# Patient Record
Sex: FEMALE | Race: WHITE | NOT HISPANIC OR LATINO | Employment: FULL TIME
[De-identification: names, ages, dates, MRNs, and addresses within clinical notes are randomized per-mention and may not be internally consistent; named-entity substitution may affect disease eponyms.]

---

## 2017-08-12 ENCOUNTER — HEALTH MAINTENANCE LETTER (OUTPATIENT)
Age: 42
End: 2017-08-12

## 2019-11-06 ENCOUNTER — HEALTH MAINTENANCE LETTER (OUTPATIENT)
Age: 44
End: 2019-11-06

## 2020-11-29 ENCOUNTER — HEALTH MAINTENANCE LETTER (OUTPATIENT)
Age: 45
End: 2020-11-29

## 2021-02-13 ENCOUNTER — HEALTH MAINTENANCE LETTER (OUTPATIENT)
Age: 46
End: 2021-02-13

## 2021-09-19 ENCOUNTER — HEALTH MAINTENANCE LETTER (OUTPATIENT)
Age: 46
End: 2021-09-19

## 2022-01-09 ENCOUNTER — HEALTH MAINTENANCE LETTER (OUTPATIENT)
Age: 47
End: 2022-01-09

## 2022-03-06 ENCOUNTER — HEALTH MAINTENANCE LETTER (OUTPATIENT)
Age: 47
End: 2022-03-06

## 2022-11-21 ENCOUNTER — HEALTH MAINTENANCE LETTER (OUTPATIENT)
Age: 47
End: 2022-11-21

## 2023-04-16 ENCOUNTER — HEALTH MAINTENANCE LETTER (OUTPATIENT)
Age: 48
End: 2023-04-16

## 2024-01-10 ENCOUNTER — HOSPITAL ENCOUNTER (EMERGENCY)
Facility: CLINIC | Age: 49
Discharge: HOME | End: 2024-01-10
Attending: EMERGENCY MEDICINE | Admitting: EMERGENCY MEDICINE
Payer: COMMERCIAL

## 2024-01-10 VITALS
BODY MASS INDEX: 23.9 KG/M2 | RESPIRATION RATE: 20 BRPM | HEIGHT: 64 IN | HEART RATE: 98 BPM | TEMPERATURE: 97.9 F | OXYGEN SATURATION: 98 % | SYSTOLIC BLOOD PRESSURE: 168 MMHG | WEIGHT: 140 LBS | DIASTOLIC BLOOD PRESSURE: 92 MMHG

## 2024-01-10 DIAGNOSIS — I10 HYPERTENSION, UNSPECIFIED TYPE: ICD-10-CM

## 2024-01-10 LAB
ALBUMIN SERPL BCG-MCNC: 4.9 G/DL (ref 3.5–5.2)
ALP SERPL-CCNC: 42 U/L (ref 40–150)
ALT SERPL W P-5'-P-CCNC: 199 U/L (ref 0–50)
ANION GAP SERPL CALCULATED.3IONS-SCNC: 18 MMOL/L (ref 7–15)
AST SERPL W P-5'-P-CCNC: 250 U/L (ref 0–45)
ATRIAL RATE - MUSE: 100 BPM
BASOPHILS # BLD AUTO: 0 10E3/UL (ref 0–0.2)
BASOPHILS NFR BLD AUTO: 0 %
BILIRUB SERPL-MCNC: 0.6 MG/DL
BUN SERPL-MCNC: 5.8 MG/DL (ref 6–20)
CALCIUM SERPL-MCNC: 9.9 MG/DL (ref 8.6–10)
CHLORIDE SERPL-SCNC: 96 MMOL/L (ref 98–107)
CREAT SERPL-MCNC: 0.59 MG/DL (ref 0.51–0.95)
DEPRECATED HCO3 PLAS-SCNC: 24 MMOL/L (ref 22–29)
DIASTOLIC BLOOD PRESSURE - MUSE: NORMAL MMHG
EGFRCR SERPLBLD CKD-EPI 2021: >90 ML/MIN/1.73M2
EOSINOPHIL # BLD AUTO: 0 10E3/UL (ref 0–0.7)
EOSINOPHIL NFR BLD AUTO: 0 %
ERYTHROCYTE [DISTWIDTH] IN BLOOD BY AUTOMATED COUNT: 13.2 % (ref 10–15)
GLUCOSE SERPL-MCNC: 118 MG/DL (ref 70–99)
HCG UR QL: NEGATIVE
HCT VFR BLD AUTO: 39.6 % (ref 35–47)
HGB BLD-MCNC: 13.7 G/DL (ref 11.7–15.7)
IMM GRANULOCYTES # BLD: 0 10E3/UL
IMM GRANULOCYTES NFR BLD: 1 %
INTERPRETATION ECG - MUSE: NORMAL
LYMPHOCYTES # BLD AUTO: 0.4 10E3/UL (ref 0.8–5.3)
LYMPHOCYTES NFR BLD AUTO: 5 %
MCH RBC QN AUTO: 36.1 PG (ref 26.5–33)
MCHC RBC AUTO-ENTMCNC: 34.6 G/DL (ref 31.5–36.5)
MCV RBC AUTO: 104 FL (ref 78–100)
MONOCYTES # BLD AUTO: 0.5 10E3/UL (ref 0–1.3)
MONOCYTES NFR BLD AUTO: 5 %
NEUTROPHILS # BLD AUTO: 7.6 10E3/UL (ref 1.6–8.3)
NEUTROPHILS NFR BLD AUTO: 89 %
NRBC # BLD AUTO: 0 10E3/UL
NRBC BLD AUTO-RTO: 0 /100
P AXIS - MUSE: 49 DEGREES
PLATELET # BLD AUTO: 212 10E3/UL (ref 150–450)
POTASSIUM SERPL-SCNC: 4.4 MMOL/L (ref 3.4–5.3)
PR INTERVAL - MUSE: 160 MS
PROT SERPL-MCNC: 8.1 G/DL (ref 6.4–8.3)
QRS DURATION - MUSE: 82 MS
QT - MUSE: 382 MS
QTC - MUSE: 492 MS
R AXIS - MUSE: 27 DEGREES
RBC # BLD AUTO: 3.8 10E6/UL (ref 3.8–5.2)
SODIUM SERPL-SCNC: 138 MMOL/L (ref 135–145)
SYSTOLIC BLOOD PRESSURE - MUSE: NORMAL MMHG
T AXIS - MUSE: 14 DEGREES
T4 FREE SERPL-MCNC: 1.3 NG/DL (ref 0.9–1.7)
TROPONIN T SERPL HS-MCNC: 9 NG/L
TSH SERPL DL<=0.005 MIU/L-ACNC: 5.32 UIU/ML (ref 0.3–4.2)
VENTRICULAR RATE- MUSE: 100 BPM
WBC # BLD AUTO: 8.5 10E3/UL (ref 4–11)

## 2024-01-10 PROCEDURE — 96374 THER/PROPH/DIAG INJ IV PUSH: CPT

## 2024-01-10 PROCEDURE — 250N000011 HC RX IP 250 OP 636: Performed by: EMERGENCY MEDICINE

## 2024-01-10 PROCEDURE — 93005 ELECTROCARDIOGRAM TRACING: CPT

## 2024-01-10 PROCEDURE — 99284 EMERGENCY DEPT VISIT MOD MDM: CPT | Mod: 25

## 2024-01-10 PROCEDURE — 80053 COMPREHEN METABOLIC PANEL: CPT | Performed by: EMERGENCY MEDICINE

## 2024-01-10 PROCEDURE — 84484 ASSAY OF TROPONIN QUANT: CPT | Performed by: EMERGENCY MEDICINE

## 2024-01-10 PROCEDURE — 36415 COLL VENOUS BLD VENIPUNCTURE: CPT | Performed by: EMERGENCY MEDICINE

## 2024-01-10 PROCEDURE — 81025 URINE PREGNANCY TEST: CPT | Performed by: EMERGENCY MEDICINE

## 2024-01-10 PROCEDURE — 93005 ELECTROCARDIOGRAM TRACING: CPT | Mod: 76

## 2024-01-10 PROCEDURE — 84443 ASSAY THYROID STIM HORMONE: CPT | Performed by: EMERGENCY MEDICINE

## 2024-01-10 PROCEDURE — 84439 ASSAY OF FREE THYROXINE: CPT | Performed by: EMERGENCY MEDICINE

## 2024-01-10 PROCEDURE — 85048 AUTOMATED LEUKOCYTE COUNT: CPT | Performed by: EMERGENCY MEDICINE

## 2024-01-10 RX ORDER — LABETALOL HYDROCHLORIDE 5 MG/ML
10 INJECTION, SOLUTION INTRAVENOUS ONCE
Status: DISCONTINUED | OUTPATIENT
Start: 2024-01-10 | End: 2024-01-10

## 2024-01-10 RX ORDER — ONDANSETRON 2 MG/ML
4 INJECTION INTRAMUSCULAR; INTRAVENOUS EVERY 30 MIN PRN
Status: DISCONTINUED | OUTPATIENT
Start: 2024-01-10 | End: 2024-01-10 | Stop reason: HOSPADM

## 2024-01-10 RX ORDER — METOPROLOL SUCCINATE 50 MG/1
50 TABLET, EXTENDED RELEASE ORAL DAILY
Qty: 30 TABLET | Refills: 0 | Status: SHIPPED | OUTPATIENT
Start: 2024-01-10 | End: 2024-02-09

## 2024-01-10 RX ADMIN — ONDANSETRON 4 MG: 2 INJECTION INTRAMUSCULAR; INTRAVENOUS at 12:19

## 2024-01-10 ASSESSMENT — ACTIVITIES OF DAILY LIVING (ADL)
ADLS_ACUITY_SCORE: 35
ADLS_ACUITY_SCORE: 35

## 2024-01-10 NOTE — ED TRIAGE NOTES
Pt presents for evaluation of hypertension and a shakiness. Pt woke up feeling shaky, so she then checked her BP and found it to be 190/100. Hx of HTN. Is on meds, has been compliant. Denies any unilateral numbness, tingling or weakness. Checked BP multiple times and it kept going up. Denies headache or vision changes. Admits to recreational drinking, last drink was a glass of wine. Is feeling anxious due to symptoms.

## 2024-01-10 NOTE — DISCHARGE INSTRUCTIONS
Take your BP meds as soon as you get home  Rest and push fluids  Check your BP twice daily and report to your doctor  Return if worsening symptoms

## 2024-01-10 NOTE — ED PROVIDER NOTES
"  History     Chief Complaint:  Hypertension       HPI   Amber Rocha is a 48 year old female with hypertension who presents with very high blood pressure readings and shakiness and vomiting.  She states that she went to bed slightly shaky last night and did not sleep very well.  She woke up and felt worse and checked her blood pressure.  She checked it several times and highest she got was 200.  She has had issues blood pressure controlled the past.  She is on 2 blood pressure medications.  She takes her losartan at night.  She states that she gets like this when her blood pressure really gets high.  She denies any chest pain or shortness of breath.  She denies any headache or visual changes.  She is not sure what would cause her blood pressure to become this high.  She denies any changes to her medication or any increased environmental triggers.      Independent Historian:   None - Patient Only    Review of External Notes:   none       Medications:    citalopram (CELEXA) 40 MG tablet  Buproprion  Losartan  Lipitor  Amitriptyline  Toprol XL        Past Medical History:    Past Medical History:   Diagnosis Date    Abnormal maternal glucose tolerance, complicating pregnancy, childbirth, or the puerperium, unspecified as to episode of care     Congenital vascular hamartomas    HTN  High cholesterol  Depression  Migraine      Past Surgical History:    Past Surgical History:   Procedure Laterality Date    [facility] APPENDECTOMY      1996        Physical Exam   Patient Vitals for the past 24 hrs:   BP Temp Temp src Pulse Resp SpO2 Height Weight   01/10/24 1530 (!) 168/92 -- -- 98 20 98 % -- --   01/10/24 1522 (!) 155/113 -- -- 100 16 96 % -- --   01/10/24 1515 (!) 159/95 -- -- 106 30 99 % -- --   01/10/24 1513 -- -- -- -- -- 98 % -- --   01/10/24 1512 (!) 175/108 -- -- 108 -- -- -- --   01/10/24 1227 (!) 167/107 -- -- -- -- -- -- --   01/10/24 1126 (!) 179/139 97.9  F (36.6  C) Oral 113 22 100 % 1.626 m (5' 4\") 63.5 kg (140 " lb)        Physical Exam  Constitutional:       Appearance: She is well-developed. She is diaphoretic.   HENT:      Right Ear: Tympanic membrane and external ear normal.      Left Ear: Tympanic membrane and external ear normal.      Mouth/Throat:      Mouth: Mucous membranes are moist.      Pharynx: Oropharynx is clear. No oropharyngeal exudate or posterior oropharyngeal erythema.   Eyes:      General: No scleral icterus.     Conjunctiva/sclera: Conjunctivae normal.      Pupils: Pupils are equal, round, and reactive to light.   Cardiovascular:      Rate and Rhythm: Regular rhythm. Tachycardia present.      Heart sounds: Normal heart sounds. No murmur heard.     No friction rub. No gallop.   Pulmonary:      Effort: Pulmonary effort is normal. No respiratory distress.      Breath sounds: Normal breath sounds. No wheezing or rales.   Abdominal:      General: Bowel sounds are normal. There is no distension.      Palpations: Abdomen is soft. There is no mass.      Tenderness: There is no abdominal tenderness.   Musculoskeletal:         General: Normal range of motion.      Cervical back: Normal range of motion and neck supple.   Skin:     General: Skin is warm.      Capillary Refill: Capillary refill takes less than 2 seconds.      Findings: No rash.   Neurological:      General: No focal deficit present.      Mental Status: She is alert.      Cranial Nerves: No cranial nerve deficit.      Motor: No weakness.      Coordination: Coordination normal.      Gait: Gait normal.           Emergency Department Course   ECG  ECG taken at 1134, ECG read at 1205  Sinus tach with PVC, no acute St changes  Rate 101 bpm. AL interval 132 ms. QRS duration 80 ms. QT/QTc 364/471 ms. P-R-T axes 10 31 9.       Laboratory:  Labs Ordered and Resulted from Time of ED Arrival to Time of ED Departure   COMPREHENSIVE METABOLIC PANEL - Abnormal       Result Value    Sodium 138      Potassium 4.4      Carbon Dioxide (CO2) 24      Anion Gap 18 (*)      Urea Nitrogen 5.8 (*)     Creatinine 0.59      GFR Estimate >90      Calcium 9.9      Chloride 96 (*)     Glucose 118 (*)     Alkaline Phosphatase 42       (*)      (*)     Protein Total 8.1      Albumin 4.9      Bilirubin Total 0.6     TSH WITH FREE T4 REFLEX - Abnormal    TSH 5.32 (*)    CBC WITH PLATELETS AND DIFFERENTIAL - Abnormal    WBC Count 8.5      RBC Count 3.80      Hemoglobin 13.7      Hematocrit 39.6       (*)     MCH 36.1 (*)     MCHC 34.6      RDW 13.2      Platelet Count 212      % Neutrophils 89      % Lymphocytes 5      % Monocytes 5      % Eosinophils 0      % Basophils 0      % Immature Granulocytes 1      NRBCs per 100 WBC 0      Absolute Neutrophils 7.6      Absolute Lymphocytes 0.4 (*)     Absolute Monocytes 0.5      Absolute Eosinophils 0.0      Absolute Basophils 0.0      Absolute Immature Granulocytes 0.0      Absolute NRBCs 0.0     TROPONIN T, HIGH SENSITIVITY - Normal    Troponin T, High Sensitivity 9     HCG QUALITATIVE URINE - Normal    hCG Urine Qualitative Negative     T4 FREE - Normal    Free T4 1.30            Emergency Department Course & Assessments:       Interventions:  Medications - No data to display       Assessments:  1204 Exam    Independent Interpretation (X-rays, CTs, rhythm strip):  None    Consultations/Discussion of Management or Tests:  None        Social Determinants of Health affecting care:   None and Financial Insecurity    Disposition:  The patient was discharged to home.     Impression & Plan        Medical Decision Making:  Patient presents today for evaluation of feeling shaky as well as high blood pressure.  She was having vomiting on my exam so we give her dose of Zofran.  Her blood pressure came down on its own.  She was feeling better when it did start to come down.  Nausea is much improved and she passed a p.o. challenge.  She denies any headache or vision changes or chest pain.  Her laboratory evaluation was unremarkable.  I am  not sure why her blood pressure was spiking today.  She is advised to continue with her metoprolol and losartan.  I advised her to take the doses when she gets home today.  She is asked to keep track of her blood pressure at least twice a day until she sees her doctor.  She is advised to see her doctor soon as possible.  No neurologic finding to require CT imaging of her head.  Return precaution provided.  Patient is discharged in improved condition.      Diagnosis:    ICD-10-CM    1. Hypertension, unspecified type  I10            Discharge Medications:  Discharge Medication List as of 1/10/2024  3:32 PM        START taking these medications    Details   metoprolol succinate ER (TOPROL XL) 50 MG 24 hr tablet Take 1 tablet (50 mg) by mouth daily for 30 days, Disp-30 tablet, R-0, E-Prescribe                  1/10/2024   Ingrid Conrad MD Cheng, Wenlan, MD  01/10/24 7031

## 2024-01-11 LAB
ATRIAL RATE - MUSE: 101 BPM
DIASTOLIC BLOOD PRESSURE - MUSE: NORMAL MMHG
INTERPRETATION ECG - MUSE: NORMAL
P AXIS - MUSE: 10 DEGREES
PR INTERVAL - MUSE: 132 MS
QRS DURATION - MUSE: 80 MS
QT - MUSE: 364 MS
QTC - MUSE: 471 MS
R AXIS - MUSE: 31 DEGREES
SYSTOLIC BLOOD PRESSURE - MUSE: NORMAL MMHG
T AXIS - MUSE: 9 DEGREES
VENTRICULAR RATE- MUSE: 101 BPM

## 2024-06-22 ENCOUNTER — HEALTH MAINTENANCE LETTER (OUTPATIENT)
Age: 49
End: 2024-06-22

## 2025-02-04 ASSESSMENT — PATIENT HEALTH QUESTIONNAIRE - PHQ9
SUM OF ALL RESPONSES TO PHQ QUESTIONS 1-9: 6
10. IF YOU CHECKED OFF ANY PROBLEMS, HOW DIFFICULT HAVE THESE PROBLEMS MADE IT FOR YOU TO DO YOUR WORK, TAKE CARE OF THINGS AT HOME, OR GET ALONG WITH OTHER PEOPLE: SOMEWHAT DIFFICULT
SUM OF ALL RESPONSES TO PHQ QUESTIONS 1-9: 6

## 2025-02-05 ENCOUNTER — OFFICE VISIT (OUTPATIENT)
Dept: FAMILY MEDICINE | Facility: CLINIC | Age: 50
End: 2025-02-05
Payer: COMMERCIAL

## 2025-02-05 VITALS
SYSTOLIC BLOOD PRESSURE: 133 MMHG | HEART RATE: 97 BPM | HEIGHT: 64 IN | BODY MASS INDEX: 23.68 KG/M2 | OXYGEN SATURATION: 98 % | TEMPERATURE: 98.6 F | WEIGHT: 138.7 LBS | DIASTOLIC BLOOD PRESSURE: 83 MMHG

## 2025-02-05 DIAGNOSIS — R74.8 ELEVATED LIVER ENZYMES: ICD-10-CM

## 2025-02-05 DIAGNOSIS — E78.2 MIXED HYPERLIPIDEMIA: ICD-10-CM

## 2025-02-05 DIAGNOSIS — Z12.11 SCREEN FOR COLON CANCER: ICD-10-CM

## 2025-02-05 DIAGNOSIS — Z11.59 NEED FOR HEPATITIS C SCREENING TEST: ICD-10-CM

## 2025-02-05 DIAGNOSIS — Z12.31 VISIT FOR SCREENING MAMMOGRAM: Primary | ICD-10-CM

## 2025-02-05 DIAGNOSIS — R25.1 TREMOR: ICD-10-CM

## 2025-02-05 DIAGNOSIS — N39.41 URGE INCONTINENCE OF URINE: ICD-10-CM

## 2025-02-05 DIAGNOSIS — Z13.1 SCREENING FOR DIABETES MELLITUS: ICD-10-CM

## 2025-02-05 DIAGNOSIS — D64.9 ANEMIA, UNSPECIFIED TYPE: ICD-10-CM

## 2025-02-05 DIAGNOSIS — I10 BENIGN ESSENTIAL HYPERTENSION: ICD-10-CM

## 2025-02-05 DIAGNOSIS — F32.0 MILD MAJOR DEPRESSION: ICD-10-CM

## 2025-02-05 PROBLEM — R87.810 CERVICAL HIGH RISK HPV (HUMAN PAPILLOMAVIRUS) TEST POSITIVE: Status: ACTIVE | Noted: 2019-10-17

## 2025-02-05 PROBLEM — E03.8 SUBCLINICAL HYPOTHYROIDISM: Status: ACTIVE | Noted: 2024-01-11

## 2025-02-05 LAB
ALBUMIN SERPL BCG-MCNC: 4.4 G/DL (ref 3.5–5.2)
ALP SERPL-CCNC: 38 U/L (ref 40–150)
ALT SERPL W P-5'-P-CCNC: 103 U/L (ref 0–50)
ANION GAP SERPL CALCULATED.3IONS-SCNC: 15 MMOL/L (ref 7–15)
AST SERPL W P-5'-P-CCNC: 96 U/L (ref 0–45)
BILIRUB SERPL-MCNC: 0.4 MG/DL
BUN SERPL-MCNC: 5.7 MG/DL (ref 6–20)
CALCIUM SERPL-MCNC: 9.9 MG/DL (ref 8.8–10.4)
CHLORIDE SERPL-SCNC: 103 MMOL/L (ref 98–107)
CHOLEST SERPL-MCNC: 246 MG/DL
CREAT SERPL-MCNC: 0.61 MG/DL (ref 0.51–0.95)
EGFRCR SERPLBLD CKD-EPI 2021: >90 ML/MIN/1.73M2
ERYTHROCYTE [DISTWIDTH] IN BLOOD BY AUTOMATED COUNT: 14.4 % (ref 10–15)
EST. AVERAGE GLUCOSE BLD GHB EST-MCNC: 97 MG/DL
FASTING STATUS PATIENT QL REPORTED: YES
FASTING STATUS PATIENT QL REPORTED: YES
FERRITIN SERPL-MCNC: 285 NG/ML (ref 6–175)
GLUCOSE SERPL-MCNC: 94 MG/DL (ref 70–99)
HBA1C MFR BLD: 5 % (ref 0–5.6)
HCO3 SERPL-SCNC: 21 MMOL/L (ref 22–29)
HCT VFR BLD AUTO: 34 % (ref 35–47)
HCV AB SERPL QL IA: NONREACTIVE
HDLC SERPL-MCNC: 81 MG/DL
HGB BLD-MCNC: 11.6 G/DL (ref 11.7–15.7)
HOLD SPECIMEN: NORMAL
IRON BINDING CAPACITY (ROCHE): 341 UG/DL (ref 240–430)
IRON SATN MFR SERPL: 22 % (ref 15–46)
IRON SERPL-MCNC: 76 UG/DL (ref 37–145)
LDLC SERPL CALC-MCNC: 151 MG/DL
MCH RBC QN AUTO: 37.9 PG (ref 26.5–33)
MCHC RBC AUTO-ENTMCNC: 34.1 G/DL (ref 31.5–36.5)
MCV RBC AUTO: 111 FL (ref 78–100)
NONHDLC SERPL-MCNC: 165 MG/DL
PLATELET # BLD AUTO: 310 10E3/UL (ref 150–450)
POTASSIUM SERPL-SCNC: 4.3 MMOL/L (ref 3.4–5.3)
PROT SERPL-MCNC: 7.5 G/DL (ref 6.4–8.3)
RBC # BLD AUTO: 3.06 10E6/UL (ref 3.8–5.2)
SODIUM SERPL-SCNC: 139 MMOL/L (ref 135–145)
TRIGL SERPL-MCNC: 68 MG/DL
TSH SERPL DL<=0.005 MIU/L-ACNC: 1.39 UIU/ML (ref 0.3–4.2)
WBC # BLD AUTO: 4.9 10E3/UL (ref 4–11)

## 2025-02-05 PROCEDURE — 86803 HEPATITIS C AB TEST: CPT

## 2025-02-05 PROCEDURE — 83550 IRON BINDING TEST: CPT

## 2025-02-05 PROCEDURE — 80053 COMPREHEN METABOLIC PANEL: CPT

## 2025-02-05 PROCEDURE — 85027 COMPLETE CBC AUTOMATED: CPT

## 2025-02-05 PROCEDURE — 84443 ASSAY THYROID STIM HORMONE: CPT

## 2025-02-05 PROCEDURE — 83036 HEMOGLOBIN GLYCOSYLATED A1C: CPT

## 2025-02-05 PROCEDURE — 80061 LIPID PANEL: CPT

## 2025-02-05 PROCEDURE — 82728 ASSAY OF FERRITIN: CPT

## 2025-02-05 PROCEDURE — 36415 COLL VENOUS BLD VENIPUNCTURE: CPT

## 2025-02-05 PROCEDURE — G2211 COMPLEX E/M VISIT ADD ON: HCPCS

## 2025-02-05 PROCEDURE — 99204 OFFICE O/P NEW MOD 45 MIN: CPT

## 2025-02-05 PROCEDURE — 83540 ASSAY OF IRON: CPT

## 2025-02-05 RX ORDER — ATORVASTATIN CALCIUM 20 MG/1
20 TABLET, FILM COATED ORAL AT BEDTIME
Qty: 90 TABLET | Refills: 3 | Status: SHIPPED | OUTPATIENT
Start: 2025-02-05

## 2025-02-05 RX ORDER — METOPROLOL SUCCINATE 50 MG/1
50 TABLET, EXTENDED RELEASE ORAL DAILY
Qty: 90 TABLET | Refills: 3 | Status: SHIPPED | OUTPATIENT
Start: 2025-02-05

## 2025-02-05 RX ORDER — BUPROPION HYDROCHLORIDE 300 MG/1
300 TABLET ORAL AT BEDTIME
Qty: 90 TABLET | Refills: 3 | Status: SHIPPED | OUTPATIENT
Start: 2025-02-05

## 2025-02-05 RX ORDER — CITALOPRAM HYDROBROMIDE 40 MG/1
40 TABLET ORAL DAILY
Qty: 90 TABLET | Refills: 3 | Status: SHIPPED | OUTPATIENT
Start: 2025-02-05

## 2025-02-05 RX ORDER — BUPROPION HYDROCHLORIDE 300 MG/1
300 TABLET ORAL AT BEDTIME
COMMUNITY
End: 2025-02-05

## 2025-02-05 RX ORDER — IRBESARTAN 300 MG/1
300 TABLET ORAL AT BEDTIME
COMMUNITY
End: 2025-02-05

## 2025-02-05 RX ORDER — IRBESARTAN 300 MG/1
300 TABLET ORAL AT BEDTIME
Qty: 90 TABLET | Refills: 3 | Status: SHIPPED | OUTPATIENT
Start: 2025-02-05

## 2025-02-05 RX ORDER — ATORVASTATIN CALCIUM 20 MG/1
20 TABLET, FILM COATED ORAL AT BEDTIME
COMMUNITY
End: 2025-02-05

## 2025-02-05 ASSESSMENT — PAIN SCALES - GENERAL: PAINLEVEL_OUTOF10: NO PAIN (0)

## 2025-02-05 NOTE — PROGRESS NOTES
Assessment & Plan     Mild major depression  Reviewed management strategies. Recommended decreasing alcohol. Offered therapy but declines at this time. Feels she is coping. Also reviewed importance of sleep, physical activity, and healthy diet.   - buPROPion (WELLBUTRIN XL) 300 MG 24 hr tablet  Dispense: 90 tablet; Refill: 3  - citalopram (CELEXA) 40 MG tablet  Dispense: 90 tablet; Refill: 3    Visit for screening mammogram  - MA Screening Bilateral w/ Ger    Screen for colon cancer  Declines colonoscopy. Would prefer FIT testing. Reviewed family history-no colon cancer.   - Fecal colorectal cancer screen (FIT)  - Fecal colorectal cancer screen (FIT)    Need for hepatitis C screening test  - Hepatitis C Screen Reflex to HCV RNA Quant and Genotype  - Hepatitis C Screen Reflex to HCV RNA Quant and Genotype    Benign essential hypertension  Stable chronic, taking medications as instructed, no medication side effects noted, no TIA's, no chest pain on exertion, no dyspnea on exertion, no swelling of ankles.     - irbesartan (AVAPRO) 300 MG tablet  Dispense: 90 tablet; Refill: 3  - metoprolol succinate ER (TOPROL XL) 50 MG 24 hr tablet  Dispense: 90 tablet; Refill: 3  - Comprehensive metabolic panel (BMP + Alb, Alk Phos, ALT, AST, Total. Bili, TP)  - CBC with Platelets and Reflex to Iron Studies  - Comprehensive metabolic panel (BMP + Alb, Alk Phos, ALT, AST, Total. Bili, TP)  - CBC with Platelets and Reflex to Iron Studies  - Iron & Iron Binding Capacity  - Ferritin    Screening for diabetes mellitus  Screening  - Hemoglobin A1c  - Hemoglobin A1c    Tremor  Rule out thyroid, anemia. If bothersome, refer to neurology for consult. No abnormalities on neurologic exam today.   - TSH with free T4 reflex  - TSH with free T4 reflex    Mixed hyperlipidemia  Stable chronic, monitor with labs, may need to increase atorvastatin, which she is open to. Will manage based on ASCVD to keep risk below 5.5%.   - Lipid panel reflex to  "direct LDL Fasting  - atorvastatin (LIPITOR) 20 MG tablet  Dispense: 90 tablet; Refill: 3  - Lipid panel reflex to direct LDL Fasting    Urge incontinence of urine  Offered referral to PT> She does not have time or money for this at this time, so would like a medication instead.               {FOLLOW UP PLANS (Optional) Includes COVID19 Treatment Plan:096770}    Dean Clark is a 49 year old, presenting for the following health issues:  Establish Care (Here to establish care. /) and Recheck Medication (Patient is here to recheck all her medications. She brought a list to show the provider. )        2/5/2025     9:43 AM   Additional Questions   Roomed by Hyacinth Davalos MA Learner   Accompanied by Self     History of Present Illness       Reason for visit:  Annual Checkup - Prescription Refills   She is taking medications regularly.         Medication Follow Up  Taking Medication as prescribed: yes  Side Effects:  None  Medication Helping Symptoms:  yes    {additonal problems for provider to add (Optional):432789}  Depresson: Has been under a lot of stress, managing with alcohol, has cut back but was drinking a bottle of wine a night. Otherwise tolerating medication well and declines further eval  HTN: No concerns with side effects  DM screening: History of GDM  Tremor: Hands, legs. No palpitations, worse over the past 2-3 months  Incontinence: Has urge and has to go and if isn't near a bathroom, loses control of bladder. No back pain, dysuria.      ROS: 10 point ROS neg other than the symptoms noted above in the HPI.       Objective    /83 (BP Location: Right arm, Patient Position: Sitting, Cuff Size: Adult Regular)   Pulse 97   Temp 98.6  F (37  C) (Oral)   Ht 1.626 m (5' 4\")   Wt 62.9 kg (138 lb 11.2 oz)   LMP 02/03/2025 (Exact Date)   SpO2 98%   BMI 23.81 kg/m    Body mass index is 23.81 kg/m .  Physical Exam   GENERAL: alert and no distress  EYES: Eyes grossly normal to inspection, " PERRL and conjunctivae and sclerae normal  HENT: ear canals and TM's normal, nose and mouth without ulcers or lesions  NECK: no adenopathy, no asymmetry, masses, or scars  RESP: lungs clear to auscultation - no rales, rhonchi or wheezes  CV: regular rate and rhythm, normal S1 S2, no S3 or S4, no murmur, click or rub, no peripheral edema  MS: no gross musculoskeletal defects noted, no edema  SKIN: no suspicious lesions or rashes  NEURO: Normal strength and tone, mentation intact and speech normal, reflexes equal and 2+ bilaterally.  PSYCH: mentation appears normal, affect normal/bright    {Diagnostic Test Results (Optional):461426}        Signed Electronically by: SOPHY Stoll CNP  {Email feedback regarding this note to [email or web address]   :[id number]}

## 2025-02-06 ENCOUNTER — PATIENT OUTREACH (OUTPATIENT)
Dept: CARE COORDINATION | Facility: CLINIC | Age: 50
End: 2025-02-06
Payer: COMMERCIAL

## 2025-02-09 RX ORDER — OXYBUTYNIN CHLORIDE 5 MG/1
5 TABLET, EXTENDED RELEASE ORAL DAILY
Qty: 30 TABLET | Refills: 2 | Status: SHIPPED | OUTPATIENT
Start: 2025-02-09

## 2025-02-28 ENCOUNTER — ANCILLARY PROCEDURE (OUTPATIENT)
Dept: MAMMOGRAPHY | Facility: CLINIC | Age: 50
End: 2025-02-28
Payer: COMMERCIAL

## 2025-02-28 DIAGNOSIS — Z12.31 VISIT FOR SCREENING MAMMOGRAM: ICD-10-CM

## 2025-02-28 PROCEDURE — 77067 SCR MAMMO BI INCL CAD: CPT | Mod: TC | Performed by: RADIOLOGY

## 2025-02-28 PROCEDURE — 77063 BREAST TOMOSYNTHESIS BI: CPT | Mod: TC | Performed by: RADIOLOGY

## 2025-03-03 ENCOUNTER — MYC MEDICAL ADVICE (OUTPATIENT)
Dept: FAMILY MEDICINE | Facility: CLINIC | Age: 50
End: 2025-03-03
Payer: COMMERCIAL

## 2025-03-06 DIAGNOSIS — N39.41 URGE INCONTINENCE OF URINE: ICD-10-CM

## 2025-03-06 RX ORDER — OXYBUTYNIN CHLORIDE 5 MG/1
5 TABLET, EXTENDED RELEASE ORAL DAILY
Qty: 90 TABLET | Refills: 3 | Status: SHIPPED | OUTPATIENT
Start: 2025-03-06

## 2025-04-24 ENCOUNTER — HOSPITAL ENCOUNTER (INPATIENT)
Facility: CLINIC | Age: 50
End: 2025-04-24
Attending: STUDENT IN AN ORGANIZED HEALTH CARE EDUCATION/TRAINING PROGRAM | Admitting: HOSPITALIST
Payer: COMMERCIAL

## 2025-04-24 ENCOUNTER — APPOINTMENT (OUTPATIENT)
Dept: CT IMAGING | Facility: CLINIC | Age: 50
End: 2025-04-24
Attending: STUDENT IN AN ORGANIZED HEALTH CARE EDUCATION/TRAINING PROGRAM
Payer: COMMERCIAL

## 2025-04-24 VITALS
OXYGEN SATURATION: 97 % | SYSTOLIC BLOOD PRESSURE: 137 MMHG | DIASTOLIC BLOOD PRESSURE: 109 MMHG | TEMPERATURE: 98.2 F | RESPIRATION RATE: 22 BRPM | HEART RATE: 92 BPM

## 2025-04-24 DIAGNOSIS — R44.1 VISUAL HALLUCINATIONS: ICD-10-CM

## 2025-04-24 DIAGNOSIS — R74.01 TRANSAMINITIS: ICD-10-CM

## 2025-04-24 DIAGNOSIS — F10.239 ALCOHOL DEPENDENCE WITH WITHDRAWAL WITH COMPLICATION (H): Primary | ICD-10-CM

## 2025-04-24 DIAGNOSIS — E87.1 HYPONATREMIA: ICD-10-CM

## 2025-04-24 DIAGNOSIS — E83.51 HYPOCALCEMIA: ICD-10-CM

## 2025-04-24 PROBLEM — R41.0 DELIRIUM: Status: ACTIVE | Noted: 2025-04-24

## 2025-04-24 PROBLEM — R44.3 HALLUCINATIONS: Status: ACTIVE | Noted: 2025-04-24

## 2025-04-24 PROBLEM — F32.A DEPRESSION: Status: ACTIVE | Noted: 2025-04-24

## 2025-04-24 LAB
ALBUMIN SERPL BCG-MCNC: 4.7 G/DL (ref 3.5–5.2)
ALP SERPL-CCNC: 57 U/L (ref 40–150)
ALT SERPL W P-5'-P-CCNC: 206 U/L (ref 0–50)
AMMONIA PLAS-SCNC: 24 UMOL/L (ref 11–51)
AMPHETAMINES UR QL SCN: ABNORMAL
ANION GAP SERPL CALCULATED.3IONS-SCNC: 19 MMOL/L (ref 7–15)
APAP SERPL-MCNC: <5 UG/ML (ref 10–30)
APPEARANCE CSF: CLEAR
AST SERPL W P-5'-P-CCNC: 226 U/L (ref 0–45)
ATRIAL RATE - MUSE: 75 BPM
BARBITURATES UR QL SCN: ABNORMAL
BASOPHILS # BLD AUTO: 0 10E3/UL (ref 0–0.2)
BASOPHILS NFR BLD AUTO: 0 %
BENZODIAZ UR QL SCN: ABNORMAL
BILIRUB SERPL-MCNC: 0.7 MG/DL
BUN SERPL-MCNC: 8.8 MG/DL (ref 6–20)
BZE UR QL SCN: ABNORMAL
CALCIUM SERPL-MCNC: 10.5 MG/DL (ref 8.8–10.4)
CANNABINOIDS UR QL SCN: ABNORMAL
CHLORIDE SERPL-SCNC: 92 MMOL/L (ref 98–107)
COLOR CSF: COLORLESS
CREAT SERPL-MCNC: 0.79 MG/DL (ref 0.51–0.95)
DIASTOLIC BLOOD PRESSURE - MUSE: NORMAL MMHG
EGFRCR SERPLBLD CKD-EPI 2021: >90 ML/MIN/1.73M2
EOSINOPHIL # BLD AUTO: 0.2 10E3/UL (ref 0–0.7)
EOSINOPHIL NFR BLD AUTO: 2 %
ERYTHROCYTE [DISTWIDTH] IN BLOOD BY AUTOMATED COUNT: 12.6 % (ref 10–15)
ETHANOL SERPL-MCNC: <0.01 G/DL
FENTANYL UR QL: ABNORMAL
GLUCOSE CSF-MCNC: 55 MG/DL (ref 40–70)
GLUCOSE SERPL-MCNC: 77 MG/DL (ref 70–99)
HCO3 SERPL-SCNC: 19 MMOL/L (ref 22–29)
HCT VFR BLD AUTO: 35.5 % (ref 35–47)
HGB BLD-MCNC: 12.4 G/DL (ref 11.7–15.7)
HOLD SPECIMEN: NORMAL
HOLD SPECIMEN: NORMAL
IMM GRANULOCYTES # BLD: 0 10E3/UL
IMM GRANULOCYTES NFR BLD: 1 %
INTERPRETATION ECG - MUSE: NORMAL
LYMPHOCYTES # BLD AUTO: 1.1 10E3/UL (ref 0.8–5.3)
LYMPHOCYTES NFR BLD AUTO: 15 %
MCH RBC QN AUTO: 37.3 PG (ref 26.5–33)
MCHC RBC AUTO-ENTMCNC: 34.9 G/DL (ref 31.5–36.5)
MCV RBC AUTO: 107 FL (ref 78–100)
MONOCYTES # BLD AUTO: 1 10E3/UL (ref 0–1.3)
MONOCYTES NFR BLD AUTO: 13 %
NEUTROPHILS # BLD AUTO: 5 10E3/UL (ref 1.6–8.3)
NEUTROPHILS NFR BLD AUTO: 69 %
NRBC # BLD AUTO: 0 10E3/UL
NRBC BLD AUTO-RTO: 0 /100
OPIATES UR QL SCN: ABNORMAL
P AXIS - MUSE: 33 DEGREES
PCP QUAL URINE (ROCHE): ABNORMAL
PLATELET # BLD AUTO: 252 10E3/UL (ref 150–450)
POTASSIUM SERPL-SCNC: 3.8 MMOL/L (ref 3.4–5.3)
PR INTERVAL - MUSE: 146 MS
PROT CSF-MCNC: 24.1 MG/DL (ref 15–45)
PROT SERPL-MCNC: 8 G/DL (ref 6.4–8.3)
QRS DURATION - MUSE: 88 MS
QT - MUSE: 400 MS
QTC - MUSE: 446 MS
R AXIS - MUSE: 26 DEGREES
RBC # BLD AUTO: 3.32 10E6/UL (ref 3.8–5.2)
RBC # CSF MANUAL: 1 /UL (ref 0–2)
SODIUM SERPL-SCNC: 130 MMOL/L (ref 135–145)
SYSTOLIC BLOOD PRESSURE - MUSE: NORMAL MMHG
T AXIS - MUSE: 8 DEGREES
TUBE # CSF: 4
VENTRICULAR RATE- MUSE: 75 BPM
WBC # BLD AUTO: 7.2 10E3/UL (ref 4–11)
WBC # CSF MANUAL: 1 /UL (ref 0–5)

## 2025-04-24 PROCEDURE — 250N000009 HC RX 250: Performed by: STUDENT IN AN ORGANIZED HEALTH CARE EDUCATION/TRAINING PROGRAM

## 2025-04-24 PROCEDURE — 96375 TX/PRO/DX INJ NEW DRUG ADDON: CPT

## 2025-04-24 PROCEDURE — 82077 ASSAY SPEC XCP UR&BREATH IA: CPT | Performed by: STUDENT IN AN ORGANIZED HEALTH CARE EDUCATION/TRAINING PROGRAM

## 2025-04-24 PROCEDURE — A9585 GADOBUTROL INJECTION: HCPCS | Performed by: STUDENT IN AN ORGANIZED HEALTH CARE EDUCATION/TRAINING PROGRAM

## 2025-04-24 PROCEDURE — 85025 COMPLETE CBC W/AUTO DIFF WBC: CPT | Performed by: PHYSICIAN ASSISTANT

## 2025-04-24 PROCEDURE — 82140 ASSAY OF AMMONIA: CPT | Performed by: STUDENT IN AN ORGANIZED HEALTH CARE EDUCATION/TRAINING PROGRAM

## 2025-04-24 PROCEDURE — 87483 CNS DNA AMP PROBE TYPE 12-25: CPT | Performed by: STUDENT IN AN ORGANIZED HEALTH CARE EDUCATION/TRAINING PROGRAM

## 2025-04-24 PROCEDURE — 70498 CT ANGIOGRAPHY NECK: CPT

## 2025-04-24 PROCEDURE — 250N000011 HC RX IP 250 OP 636: Performed by: STUDENT IN AN ORGANIZED HEALTH CARE EDUCATION/TRAINING PROGRAM

## 2025-04-24 PROCEDURE — 250N000013 HC RX MED GY IP 250 OP 250 PS 637: Performed by: HOSPITALIST

## 2025-04-24 PROCEDURE — 84157 ASSAY OF PROTEIN OTHER: CPT | Performed by: STUDENT IN AN ORGANIZED HEALTH CARE EDUCATION/TRAINING PROGRAM

## 2025-04-24 PROCEDURE — 86255 FLUORESCENT ANTIBODY SCREEN: CPT | Performed by: STUDENT IN AN ORGANIZED HEALTH CARE EDUCATION/TRAINING PROGRAM

## 2025-04-24 PROCEDURE — 82310 ASSAY OF CALCIUM: CPT | Performed by: PHYSICIAN ASSISTANT

## 2025-04-24 PROCEDURE — 36415 COLL VENOUS BLD VENIPUNCTURE: CPT | Performed by: PHYSICIAN ASSISTANT

## 2025-04-24 PROCEDURE — 255N000002 HC RX 255 OP 636: Performed by: STUDENT IN AN ORGANIZED HEALTH CARE EDUCATION/TRAINING PROGRAM

## 2025-04-24 PROCEDURE — 84100 ASSAY OF PHOSPHORUS: CPT | Performed by: HOSPITALIST

## 2025-04-24 PROCEDURE — 83735 ASSAY OF MAGNESIUM: CPT | Performed by: HOSPITALIST

## 2025-04-24 PROCEDURE — 82945 GLUCOSE OTHER FLUID: CPT | Performed by: STUDENT IN AN ORGANIZED HEALTH CARE EDUCATION/TRAINING PROGRAM

## 2025-04-24 PROCEDURE — 80143 DRUG ASSAY ACETAMINOPHEN: CPT | Performed by: STUDENT IN AN ORGANIZED HEALTH CARE EDUCATION/TRAINING PROGRAM

## 2025-04-24 PROCEDURE — 36415 COLL VENOUS BLD VENIPUNCTURE: CPT | Performed by: STUDENT IN AN ORGANIZED HEALTH CARE EDUCATION/TRAINING PROGRAM

## 2025-04-24 PROCEDURE — 96374 THER/PROPH/DIAG INJ IV PUSH: CPT

## 2025-04-24 PROCEDURE — 80307 DRUG TEST PRSMV CHEM ANLYZR: CPT | Performed by: PHYSICIAN ASSISTANT

## 2025-04-24 PROCEDURE — 70450 CT HEAD/BRAIN W/O DYE: CPT

## 2025-04-24 PROCEDURE — 99285 EMERGENCY DEPT VISIT HI MDM: CPT | Mod: 25

## 2025-04-24 PROCEDURE — 88108 CYTOPATH CONCENTRATE TECH: CPT | Mod: 26

## 2025-04-24 PROCEDURE — 89050 BODY FLUID CELL COUNT: CPT | Performed by: STUDENT IN AN ORGANIZED HEALTH CARE EDUCATION/TRAINING PROGRAM

## 2025-04-24 PROCEDURE — 120N000001 HC R&B MED SURG/OB

## 2025-04-24 PROCEDURE — 62270 DX LMBR SPI PNXR: CPT

## 2025-04-24 PROCEDURE — 87070 CULTURE OTHR SPECIMN AEROBIC: CPT | Performed by: STUDENT IN AN ORGANIZED HEALTH CARE EDUCATION/TRAINING PROGRAM

## 2025-04-24 PROCEDURE — 99223 1ST HOSP IP/OBS HIGH 75: CPT | Performed by: HOSPITALIST

## 2025-04-24 PROCEDURE — 93005 ELECTROCARDIOGRAM TRACING: CPT

## 2025-04-24 PROCEDURE — 88108 CYTOPATH CONCENTRATE TECH: CPT | Mod: TC | Performed by: STUDENT IN AN ORGANIZED HEALTH CARE EDUCATION/TRAINING PROGRAM

## 2025-04-24 RX ORDER — CLONIDINE HYDROCHLORIDE 0.1 MG/1
0.1 TABLET ORAL EVERY 8 HOURS
Status: DISCONTINUED | OUTPATIENT
Start: 2025-04-24 | End: 2025-04-25 | Stop reason: HOSPADM

## 2025-04-24 RX ORDER — FLUMAZENIL 0.1 MG/ML
0.2 INJECTION, SOLUTION INTRAVENOUS
Status: DISCONTINUED | OUTPATIENT
Start: 2025-04-24 | End: 2025-04-25 | Stop reason: HOSPADM

## 2025-04-24 RX ORDER — GABAPENTIN 300 MG/1
900 CAPSULE ORAL EVERY 8 HOURS
Status: DISCONTINUED | OUTPATIENT
Start: 2025-04-25 | End: 2025-04-25 | Stop reason: HOSPADM

## 2025-04-24 RX ORDER — METOPROLOL SUCCINATE 25 MG/1
50 TABLET, EXTENDED RELEASE ORAL DAILY
Status: DISCONTINUED | OUTPATIENT
Start: 2025-04-25 | End: 2025-04-25 | Stop reason: HOSPADM

## 2025-04-24 RX ORDER — THIAMINE HYDROCHLORIDE 100 MG/ML
250 INJECTION, SOLUTION INTRAMUSCULAR; INTRAVENOUS DAILY
Status: DISCONTINUED | OUTPATIENT
Start: 2025-04-27 | End: 2025-04-25 | Stop reason: HOSPADM

## 2025-04-24 RX ORDER — OLANZAPINE 5 MG/1
5-10 TABLET, ORALLY DISINTEGRATING ORAL EVERY 6 HOURS PRN
Status: DISCONTINUED | OUTPATIENT
Start: 2025-04-24 | End: 2025-04-25 | Stop reason: HOSPADM

## 2025-04-24 RX ORDER — GABAPENTIN 600 MG/1
1200 TABLET ORAL ONCE
Status: COMPLETED | OUTPATIENT
Start: 2025-04-24 | End: 2025-04-24

## 2025-04-24 RX ORDER — THIAMINE HYDROCHLORIDE 100 MG/ML
500 INJECTION, SOLUTION INTRAMUSCULAR; INTRAVENOUS ONCE
Status: COMPLETED | OUTPATIENT
Start: 2025-04-24 | End: 2025-04-24

## 2025-04-24 RX ORDER — DIAZEPAM 10 MG/1
10 TABLET ORAL EVERY 30 MIN PRN
Status: DISCONTINUED | OUTPATIENT
Start: 2025-04-24 | End: 2025-04-25 | Stop reason: HOSPADM

## 2025-04-24 RX ORDER — IRBESARTAN 300 MG/1
300 TABLET ORAL AT BEDTIME
Status: DISCONTINUED | OUTPATIENT
Start: 2025-04-24 | End: 2025-04-25 | Stop reason: HOSPADM

## 2025-04-24 RX ORDER — GADOBUTROL 604.72 MG/ML
6 INJECTION INTRAVENOUS ONCE
Status: COMPLETED | OUTPATIENT
Start: 2025-04-24 | End: 2025-04-24

## 2025-04-24 RX ORDER — DIAZEPAM 10 MG/2ML
5-10 INJECTION, SOLUTION INTRAMUSCULAR; INTRAVENOUS EVERY 30 MIN PRN
Status: DISCONTINUED | OUTPATIENT
Start: 2025-04-24 | End: 2025-04-25 | Stop reason: HOSPADM

## 2025-04-24 RX ORDER — HALOPERIDOL 5 MG/ML
2.5-5 INJECTION INTRAMUSCULAR EVERY 6 HOURS PRN
Status: DISCONTINUED | OUTPATIENT
Start: 2025-04-24 | End: 2025-04-25 | Stop reason: HOSPADM

## 2025-04-24 RX ORDER — THIAMINE HYDROCHLORIDE 100 MG/ML
500 INJECTION, SOLUTION INTRAMUSCULAR; INTRAVENOUS 3 TIMES DAILY
Status: DISCONTINUED | OUTPATIENT
Start: 2025-04-25 | End: 2025-04-25 | Stop reason: HOSPADM

## 2025-04-24 RX ORDER — IOPAMIDOL 755 MG/ML
67 INJECTION, SOLUTION INTRAVASCULAR ONCE
Status: COMPLETED | OUTPATIENT
Start: 2025-04-24 | End: 2025-04-24

## 2025-04-24 RX ORDER — LORAZEPAM 2 MG/ML
1 INJECTION INTRAMUSCULAR ONCE
Status: COMPLETED | OUTPATIENT
Start: 2025-04-24 | End: 2025-04-24

## 2025-04-24 RX ORDER — GABAPENTIN 300 MG/1
300 CAPSULE ORAL EVERY 8 HOURS
Status: DISCONTINUED | OUTPATIENT
Start: 2025-04-30 | End: 2025-04-25 | Stop reason: HOSPADM

## 2025-04-24 RX ORDER — GABAPENTIN 300 MG/1
600 CAPSULE ORAL EVERY 8 HOURS
Status: DISCONTINUED | OUTPATIENT
Start: 2025-04-28 | End: 2025-04-25 | Stop reason: HOSPADM

## 2025-04-24 RX ORDER — GABAPENTIN 100 MG/1
100 CAPSULE ORAL EVERY 8 HOURS
Status: DISCONTINUED | OUTPATIENT
Start: 2025-05-02 | End: 2025-04-25 | Stop reason: HOSPADM

## 2025-04-24 RX ADMIN — GABAPENTIN 1200 MG: 600 TABLET, FILM COATED ORAL at 23:33

## 2025-04-24 RX ADMIN — CLONIDINE HYDROCHLORIDE 0.1 MG: 0.1 TABLET ORAL at 23:33

## 2025-04-24 RX ADMIN — THIAMINE HYDROCHLORIDE 500 MG: 100 INJECTION, SOLUTION INTRAMUSCULAR; INTRAVENOUS at 22:23

## 2025-04-24 RX ADMIN — SODIUM CHLORIDE 100 ML: 9 INJECTION, SOLUTION INTRAVENOUS at 20:39

## 2025-04-24 RX ADMIN — IOPAMIDOL 67 ML: 755 INJECTION, SOLUTION INTRAVENOUS at 20:39

## 2025-04-24 RX ADMIN — LORAZEPAM 1 MG: 2 INJECTION INTRAMUSCULAR; INTRAVENOUS at 20:30

## 2025-04-24 ASSESSMENT — ACTIVITIES OF DAILY LIVING (ADL)
ADLS_ACUITY_SCORE: 41

## 2025-04-24 ASSESSMENT — COLUMBIA-SUICIDE SEVERITY RATING SCALE - C-SSRS
2. HAVE YOU ACTUALLY HAD ANY THOUGHTS OF KILLING YOURSELF IN THE PAST MONTH?: NO
1. IN THE PAST MONTH, HAVE YOU WISHED YOU WERE DEAD OR WISHED YOU COULD GO TO SLEEP AND NOT WAKE UP?: NO
6. HAVE YOU EVER DONE ANYTHING, STARTED TO DO ANYTHING, OR PREPARED TO DO ANYTHING TO END YOUR LIFE?: NO

## 2025-04-24 NOTE — ED TRIAGE NOTES
Arrives ambulatory from the community. States 1 month ago she took a new medications, states last week due to sleeplessness 2 nights she took benadryl.     Reports she is seeing double, reports she is hallucinating, word finding difficulty, emesis. States this started 1 week ago.     Pressured speech in triage. Brought a book full of her hallucinations written down.

## 2025-04-25 ENCOUNTER — APPOINTMENT (OUTPATIENT)
Dept: MRI IMAGING | Facility: CLINIC | Age: 50
End: 2025-04-25
Attending: STUDENT IN AN ORGANIZED HEALTH CARE EDUCATION/TRAINING PROGRAM
Payer: COMMERCIAL

## 2025-04-25 VITALS
HEIGHT: 64 IN | DIASTOLIC BLOOD PRESSURE: 72 MMHG | TEMPERATURE: 98.3 F | BODY MASS INDEX: 22.66 KG/M2 | OXYGEN SATURATION: 100 % | HEART RATE: 87 BPM | RESPIRATION RATE: 18 BRPM | WEIGHT: 132.72 LBS | SYSTOLIC BLOOD PRESSURE: 111 MMHG

## 2025-04-25 PROBLEM — F10.239 ALCOHOL DEPENDENCE WITH WITHDRAWAL WITH COMPLICATION (H): Status: ACTIVE | Noted: 2025-04-25

## 2025-04-25 LAB
ATRIAL RATE - MUSE: 75 BPM
C GATTII+NEOFOR DNA CSF QL NAA+NON-PROBE: NEGATIVE
CMV DNA CSF QL NAA+NON-PROBE: NEGATIVE
DIASTOLIC BLOOD PRESSURE - MUSE: NORMAL MMHG
E COLI K1 AG CSF QL: NEGATIVE
EV RNA SPEC QL NAA+PROBE: NEGATIVE
GP B STREP DNA CSF QL NAA+NON-PROBE: NEGATIVE
HAEM INFLU DNA CSF QL NAA+NON-PROBE: NEGATIVE
HHV6 DNA CSF QL NAA+NON-PROBE: NEGATIVE
HSV1 DNA CSF QL NAA+NON-PROBE: NEGATIVE
HSV2 DNA CSF QL NAA+NON-PROBE: NEGATIVE
INTERPRETATION ECG - MUSE: NORMAL
L MONOCYTOG DNA CSF QL NAA+NON-PROBE: NEGATIVE
MAGNESIUM SERPL-MCNC: 1.5 MG/DL (ref 1.7–2.3)
N MEN DNA CSF QL NAA+NON-PROBE: NEGATIVE
P AXIS - MUSE: 33 DEGREES
PARECHOVIRUS A RNA CSF QL NAA+NON-PROBE: NEGATIVE
PHOSPHATE SERPL-MCNC: 4 MG/DL (ref 2.5–4.5)
PR INTERVAL - MUSE: 146 MS
QRS DURATION - MUSE: 88 MS
QT - MUSE: 400 MS
QTC - MUSE: 446 MS
R AXIS - MUSE: 26 DEGREES
S PNEUM DNA CSF QL NAA+NON-PROBE: NEGATIVE
SYSTOLIC BLOOD PRESSURE - MUSE: NORMAL MMHG
T AXIS - MUSE: 8 DEGREES
VENTRICULAR RATE- MUSE: 75 BPM
VZV DNA CSF QL NAA+NON-PROBE: NEGATIVE

## 2025-04-25 PROCEDURE — 250N000011 HC RX IP 250 OP 636: Performed by: HOSPITALIST

## 2025-04-25 PROCEDURE — 96376 TX/PRO/DX INJ SAME DRUG ADON: CPT | Mod: XU

## 2025-04-25 PROCEDURE — A9585 GADOBUTROL INJECTION: HCPCS | Performed by: STUDENT IN AN ORGANIZED HEALTH CARE EDUCATION/TRAINING PROGRAM

## 2025-04-25 PROCEDURE — G0378 HOSPITAL OBSERVATION PER HR: HCPCS

## 2025-04-25 PROCEDURE — 70553 MRI BRAIN STEM W/O & W/DYE: CPT

## 2025-04-25 PROCEDURE — 99239 HOSP IP/OBS DSCHRG MGMT >30: CPT | Performed by: INTERNAL MEDICINE

## 2025-04-25 PROCEDURE — 250N000013 HC RX MED GY IP 250 OP 250 PS 637: Performed by: HOSPITALIST

## 2025-04-25 PROCEDURE — 255N000002 HC RX 255 OP 636: Performed by: STUDENT IN AN ORGANIZED HEALTH CARE EDUCATION/TRAINING PROGRAM

## 2025-04-25 RX ORDER — LANOLIN ALCOHOL/MO/W.PET/CERES
100 CREAM (GRAM) TOPICAL DAILY
Qty: 30 TABLET | Refills: 0 | Status: SHIPPED | OUTPATIENT
Start: 2025-05-02 | End: 2025-06-01

## 2025-04-25 RX ORDER — FOLIC ACID 1 MG/1
1 TABLET ORAL DAILY
Qty: 30 TABLET | Refills: 0 | Status: SHIPPED | OUTPATIENT
Start: 2025-04-25

## 2025-04-25 RX ORDER — GADOBUTROL 604.72 MG/ML
6 INJECTION INTRAVENOUS ONCE
Status: COMPLETED | OUTPATIENT
Start: 2025-04-25 | End: 2025-04-25

## 2025-04-25 RX ADMIN — THIAMINE HYDROCHLORIDE 500 MG: 100 INJECTION, SOLUTION INTRAMUSCULAR; INTRAVENOUS at 08:33

## 2025-04-25 RX ADMIN — METOPROLOL SUCCINATE 50 MG: 25 TABLET, EXTENDED RELEASE ORAL at 08:33

## 2025-04-25 RX ADMIN — CLONIDINE HYDROCHLORIDE 0.1 MG: 0.1 TABLET ORAL at 06:21

## 2025-04-25 RX ADMIN — GABAPENTIN 900 MG: 300 CAPSULE ORAL at 06:21

## 2025-04-25 RX ADMIN — GADOBUTROL 6 ML: 604.72 INJECTION INTRAVENOUS at 01:56

## 2025-04-25 ASSESSMENT — ACTIVITIES OF DAILY LIVING (ADL)
ADLS_ACUITY_SCORE: 41
DEPENDENT_IADLS:: INDEPENDENT
ADLS_ACUITY_SCORE: 41

## 2025-04-25 NOTE — PLAN OF CARE
"Cared for [id number]    Pt A/O x 4, VSS; Pt denies pain, headache, dizziness, N/V & SOB. Pt up SBA. Lung sounds clear, RA. CIWA: 2. Bruises upper arms/fore arms. Social Work following. Plan to discharge later today. Will continue with plan of care.    ++++++++++++++++++++++++++++++    PRIMARY DIAGNOSIS: \"GENERIC\" NURSING  OUTPATIENT/OBSERVATION GOALS TO BE MET BEFORE DISCHARGE:  ADLs back to baseline: Yes    Activity and level of assistance: Ambulating independently.    Pain status: Pain free.    Return to near baseline physical activity: Yes     Discharge Planner Nurse   Safe discharge environment identified: Yes  Barriers to discharge: No       Entered by: Malathi Antonio RN 04/25/2025 10:27 AM     Please review provider order for any additional goals.   Nurse to notify provider when observation goals have been met and patient is ready for discharge.    Goal Outcome Evaluation:      Plan of Care Reviewed With: patient    Overall Patient Progress: improvingOverall Patient Progress: improving    Outcome Evaluation: A/O x 4    Problem: Adult Inpatient Plan of Care  Goal: Plan of Care Review  Description: The Plan of Care Review/Shift note should be completed every shift.  The Outcome Evaluation is a brief statement about your assessment that the patient is improving, declining, or no change.  This information will be displayed automatically on your shiftnote.  Outcome: Progressing  Flowsheets (Taken 4/25/2025 1026)  Outcome Evaluation: A/O x 4  Plan of Care Reviewed With: patient  Overall Patient Progress: improving  Goal: Patient-Specific Goal (Individualized)  Description: You can add care plan individualizations to a care plan. Examples of Individualization might be:  \"Parent requests to be called daily at 9am for status\", \"I have a hard time hearing out of my right ear\", or \"Do not touch me to wake me up as it startlesme\".  Outcome: Progressing  Goal: Absence of Hospital-Acquired Illness or Injury  Outcome: " Progressing  Intervention: Identify and Manage Fall Risk  Recent Flowsheet Documentation  Taken 4/25/2025 0830 by Malathi Antonio RN  Safety Promotion/Fall Prevention:   activity supervised   lighting adjusted   mobility aid in reach   nonskid shoes/slippers when out of bed   safety round/check completed   supervised activity  Intervention: Prevent Skin Injury  Recent Flowsheet Documentation  Taken 4/25/2025 0830 by Malathi Antonio RN  Body Position: position changed independently  Intervention: Prevent Infection  Recent Flowsheet Documentation  Taken 4/25/2025 0830 by Malathi Antonio RN  Infection Prevention:   rest/sleep promoted   single patient room provided   hand hygiene promoted  Goal: Optimal Comfort and Wellbeing  Outcome: Progressing  Goal: Readiness for Transition of Care  Outcome: Progressing     Problem: Confusion Acute  Goal: Optimal Cognitive Function  Outcome: Progressing

## 2025-04-25 NOTE — ED NOTES
[facility]  ED Nurse Handoff Report    ED Chief complaint: Hallucinations  . ED Diagnosis:   Final diagnoses:   Visual hallucinations   Transaminitis   Hyponatremia   Hypocalcemia       Allergies:   Allergies   Allergen Reactions    Lisinopril Cough    Sulfa Antibiotics        Code Status: Full Code    Activity level - Baseline/Home:  independent.  Activity Level - Current:   assist of 1.   Lift room needed: No.   Bariatric: No   Needed: No   Isolation: No.   Infection: Not Applicable.     Respiratory status: Room air    Vital Signs (within 30 minutes):   Vitals:    04/24/25 2217 04/24/25 2222 04/24/25 2241 04/24/25 2245   BP: 118/90  121/86 126/84   Pulse: 85 91 97 95   Resp: 19 23 26 17   Temp:       TempSrc:       SpO2:  97% 99% 97%       Cardiac Rhythm:  ,      Pain level:    Patient confused: Yes. - intermittent, hallucinations at times.  Patient Falls Risk: nonskid shoes/slippers when out of bed, patient and family education, assistive device/personal items within reach, and activity supervised.   Elimination Status: Has voided     Patient Report - Initial Complaint:Arrives ambulatory from the community. States 1 month ago she took a new medications, states last week due to sleeplessness 2 nights she took benadryl.      Reports she is seeing double, reports she is hallucinating, word finding difficulty, emesis. States this started 1 week ago.            Pressured speech in triage. Brought a book full of her hallucinations written down.  .   Focused Assessment:    Amber Rocha is a 49 year old female with a past medical history significant for depression and anxiety here for evaluation of hallucinations. The patient reports onset of constant visual hallucinations 1 week ago. She reports seeing people that are not there and seeing inanimate object moving. She denies auditory hallucinations, suicidal ideations, and homicidal ideations. The patient's parents stated that the  patient's daughter told them about the patient's disorganized thoughts and stating things that are not real. The patient has been taking citalopram and bupropion for a long time with no recent change. She recently started taking oxybutynin 2 months ago. The patient reports taking benadryl 2 night prior to onset of hallucinations. She has trouble finding the right words for speech and she also has trouble with GERD with some vomit recently. She denies family history of schizophrenia or bipolar disorder. She drinks alcohol regularly and her last drink was 6 days ago. She denies any other drug use.   Abnormal Results:   Labs Ordered and Resulted from Time of ED Arrival to Time of ED Departure   COMPREHENSIVE METABOLIC PANEL - Abnormal       Result Value    Sodium 130 (*)     Potassium 3.8      Carbon Dioxide (CO2) 19 (*)     Anion Gap 19 (*)     Urea Nitrogen 8.8      Creatinine 0.79      GFR Estimate >90      Calcium 10.5 (*)     Chloride 92 (*)     Glucose 77      Alkaline Phosphatase 57       (*)      (*)     Protein Total 8.0      Albumin 4.7      Bilirubin Total 0.7     CBC WITH PLATELETS AND DIFFERENTIAL - Abnormal    WBC Count 7.2      RBC Count 3.32 (*)     Hemoglobin 12.4      Hematocrit 35.5       (*)     MCH 37.3 (*)     MCHC 34.9      RDW 12.6      Platelet Count 252      % Neutrophils 69      % Lymphocytes 15      % Monocytes 13      % Eosinophils 2      % Basophils 0      % Immature Granulocytes 1      NRBCs per 100 WBC 0      Absolute Neutrophils 5.0      Absolute Lymphocytes 1.1      Absolute Monocytes 1.0      Absolute Eosinophils 0.2      Absolute Basophils 0.0      Absolute Immature Granulocytes 0.0      Absolute NRBCs 0.0     URINE DRUG SCREEN PANEL - Abnormal    Amphetamines Urine Screen Positive (*)     Barbituates Urine Screen Negative      Benzodiazepine Urine Screen Negative      Cannabinoids Urine Screen Negative      Cocaine Urine Screen Negative      Fentanyl Qual Urine  Screen Negative      Opiates Urine Screen Negative      PCP Urine Screen Negative     ACETAMINOPHEN LEVEL - Abnormal    Acetaminophen <5.0 (*)    ETHYL ALCOHOL LEVEL - Normal    Alcohol ethyl <0.01     GLUCOSE CSF - Normal    Glucose CSF 55     PROTEIN TOTAL CSF - Normal    Protein total CSF 24.1     N-METHYL-D-ASPARTATE RECEPTOR ANTIBODY IGG CSF   CELL COUNT CSF   AMMONIA   NON-GYNECOLOGIC CYTOLOGY   AEROBIC BACTERIAL CULTURE ROUTINE   MENINGITIS/ENCEPHALITIS PANEL QUAL PCR CSF   CELL COUNT WITH DIFFERENTIAL CSF        CT Head w/o Contrast   Final Result   IMPRESSION:    HEAD CT:   1.  No CT evidence for acute intracranial process.   2.  Brain atrophy and presumed chronic microvascular ischemic changes as above.      HEAD CTA:    1.  No large vessel occlusion or hemodynamically significant stenosis.      NECK CTA:   1.  No large vessel occlusion or hemodynamically significant stenosis.      CTA Head Neck w Contrast   Final Result   IMPRESSION:    HEAD CT:   1.  No CT evidence for acute intracranial process.   2.  Brain atrophy and presumed chronic microvascular ischemic changes as above.      HEAD CTA:    1.  No large vessel occlusion or hemodynamically significant stenosis.      NECK CTA:   1.  No large vessel occlusion or hemodynamically significant stenosis.      MR Brain w/o & w Contrast    (Results Pending)       Treatments provided: LP puncture, labs, imaging - CT/MRI  Family Comments: parents at bedside.  OBS brochure/video discussed/provided to patient:  No  ED Medications:   Medications   lidocaine 1 % 10 mL (has no administration in time range)   gadobutrol (GADAVIST) injection 6 mL (has no administration in time range)   LORazepam (ATIVAN) injection 1 mg (1 mg Intravenous $Given 4/24/25 2030)   iopamidol (ISOVUE-370) solution 67 mL (67 mLs Intravenous $Given 4/24/25 2039)   sodium chloride 0.9 % bag for CT scan flush (100 mLs Intravenous $Given 4/24/25 2039)   thiamine (B-1) injection 500 mg (500 mg  Intravenous $Given 4/24/25 6764)       Drips infusing:  No  For the majority of the shift this patient was Green.   Interventions performed were none.    Sepsis treatment initiated: No    Cares/treatment/interventions/medications to be completed following ED care: per provider orders.     ED Nurse Name: Dorcas Arreaga RN  11:13 PM

## 2025-04-25 NOTE — CONSULTS
Care Management Initial Consult    General Information  Assessment completed with: Patient,    Type of CM/SW Visit: Initial Assessment    Primary Care Provider verified and updated as needed: Yes   Readmission within the last 30 days: no previous admission in last 30 days      Reason for Consult: discharge planning  Advance Care Planning:            Communication Assessment  Patient's communication style: spoken language (English or Bilingual)             Cognitive  Cognitive/Neuro/Behavioral: WDL                      Living Environment:   People in home: child(karis), adult     Current living Arrangements:        Able to return to prior arrangements: yes       Family/Social Support:  Care provided by: self  Provides care for: no one  Marital Status: Single  Support system: Children, Parent(s)          Description of Support System: Supportive, Involved         Current Resources:   Patient receiving home care services:          Community Resources:    Equipment currently used at home:    Supplies currently used at home:      Employment/Financial:  Employment Status: unemployed (pt reports she was laid off in August)        Financial Concerns: unemployed           Does the patient's insurance plan have a 3 day qualifying hospital stay waiver?  Yes     Which insurance plan 3 day waiver is available? Alternative insurance waiver    Will the waiver be used for post-acute placement? No    Lifestyle & Psychosocial Needs:  Social Drivers of Health     Food Insecurity: Low Risk  (2/4/2025)    Food Insecurity     Within the past 12 months, did you worry that your food would run out before you got money to buy more?: No     Within the past 12 months, did the food you bought just not last and you didn t have money to get more?: No   Depression: Not at risk (2/5/2025)    PHQ-2     PHQ-2 Score: 2   Recent Concern: Depression - At risk (12/3/2024)    Received from [facility]    PHQ-2     PHQ-2 Score: 3   Housing Stability: Low  Risk  (2/4/2025)    Housing Stability     Do you have housing? : Yes     Are you worried about losing your housing?: No   Tobacco Use: Low Risk  (2/5/2025)    Patient History     Smoking Tobacco Use: Never     Smokeless Tobacco Use: Never     Passive Exposure: Not on file   Financial Resource Strain: Low Risk  (2/4/2025)    Financial Resource Strain     Within the past 12 months, have you or your family members you live with been unable to get utilities (heat, electricity) when it was really needed?: No   Alcohol Use: Not on file   Transportation Needs: Low Risk  (2/4/2025)    Transportation Needs     Within the past 12 months, has lack of transportation kept you from medical appointments, getting your medicines, non-medical meetings or appointments, work, or from getting things that you need?: No   Physical Activity: Not on file   Interpersonal Safety: Low Risk  (2/5/2025)    Interpersonal Safety     Do you feel physically and emotionally safe where you currently live?: Yes     Within the past 12 months, have you been hit, slapped, kicked or otherwise physically hurt by someone?: No     Within the past 12 months, have you been humiliated or emotionally abused in other ways by your partner or ex-partner?: No   Stress: Not on file   Social Connections: Not on file   Health Literacy: Not on file       Functional Status:  Prior to admission patient needed assistance:   Dependent ADLs:: Independent  Dependent IADLs:: Independent       Mental Health Status:          Chemical Dependency Status:               Values/Beliefs:  Spiritual, Cultural Beliefs, Anglican Practices, Values that affect care: no               Care Management Discharge Note    Discharge Date: 04/26/2025       Discharge Disposition: Home, Outpatient Chemical Dependency    Discharge Services:  CD Resources    Discharge DME:      Discharge Transportation: agency    Patient/Family in Agreement with the Plan: yes    Handoff Referral Completed: No, handoff  not indicated or clinically appropriate    Additional Information:    SW met with pt at the bedside to offer CD resources. Pt states that she lives at home with her adult son and daughter. She shares that she is independent with cares, her children assist with household tasks. Pt is interested in CD resources, she shares that she has not determined if IP or OP Tx would be preferred. SW provided physical hand out of CD resources and placed additional resources in her AVS. Pt denies further needs at this time. SW signing off.     Alfreda Kumar/TOAN Hough, [facility]  Inpatient Care Coordination  Emergency Room /Float  [phone]    Alfreda Kumar, [facility]

## 2025-04-25 NOTE — ED PROVIDER NOTES
Emergency Department Note      History of Present Illness     Chief Complaint   Hallucinations      HPI   Amber Rocha is a 49 year old female with past medical history of depression and anxiety who presents today with hallucinations and disorganized speech.  Patient reports that these hallucinations started about a week ago.  Hallucinations are visual in nature.  She reports seeing people that are not present and describes in adamant objects moving.  States that these hallucinations occur throughout the day and have been present since that date.  Her parents are present in the room who reports that the patient's daughter called them due to concern with the patient having disorganized thought processes and saying things that were not happening.  Patient has been on citalopram and bupropion for anxiety and depression for a while.  She did recently start the medication oxybutynin about 2 months ago.  Patient reports that she then took a total of two doses of Benadryl for 2 nights prior to these hallucinations taking place.  She also states that she has troubles with word finding difficulties and reflux disease with some small amounts of emesis.  Patient denies any family history of schizophrenia or bipolar.  She denies any auditory hallucinations.  Patient does drink alcohol regularly 4-5 drinks a day. Last drink was Thursday.   Denies any tobacco use, marijuana use, illicit drug use.    Independent Historian   Parents as detailed above.      Past Medical History     Medical History and Problem List   Past Medical History:   Diagnosis Date    Abnormal maternal glucose tolerance, complicating pregnancy, childbirth, or the puerperium, unspecified as to episode of care     Congenital vascular hamartomas     Depressive disorder     Hypertension        Medications   atorvastatin (LIPITOR) 20 MG tablet  buPROPion (WELLBUTRIN XL) 300 MG 24 hr tablet  citalopram (CELEXA) 40 MG tablet  etonogestrel (NEXPLANON) 68 MG  IMPL  irbesartan (AVAPRO) 300 MG tablet  metoprolol succinate ER (TOPROL XL) 50 MG 24 hr tablet  oxyBUTYnin ER (DITROPAN XL) 5 MG 24 hr tablet        Surgical History   Past Surgical History:   Procedure Laterality Date    ZZC APPENDECTOMY      1996       Physical Exam     Patient Vitals for the past 24 hrs:   BP Temp Temp src Pulse Resp SpO2   04/24/25 2153 -- -- -- 86 11 97 %   04/24/25 2145 129/84 -- -- 96 -- --   04/24/25 2130 122/81 -- -- 98 -- --   04/24/25 2121 -- -- -- -- -- 96 %   04/24/25 2115 (!) 122/94 -- -- 90 -- 97 %   04/24/25 2101 127/78 -- -- 87 -- 96 %   04/24/25 2056 126/81 -- -- 98 -- 96 %   04/24/25 1855 (!) 133/99 97.5  F (36.4  C) Temporal 86 18 97 %     Physical Exam  GENERAL: Patient well-nourished. Alert, attentive  HEAD: Atraumatic.  EYES: Anicteric. PERRL  NOSE: No bleeding  MOUTH: Moist mucosa  THROAT: Patent airway.   NECK: No rigidity  CV: RRR, no murmurs, rubs or gallops  PULM: CTAB with good aeration; no retractions, rales, rhonchi, or wheezing  ABD: Soft, nontender, nondistended, no guarding, no peritoneal signs   DERM: Port wine birthmark right flank.  Skin warm and dry  EXTREMITY: Moving all extremities without difficulty. No calf tenderness or peripheral edema  NEURO: Alert, answering questions appropriately. Speaks clearly. CN 2-12 grossly intact.  Strength 5/5 bilateral LE/UE. Sensation fully intact to light touch symmetrically bilateral LE/UE. Normal finger-to-nose and heel to shin. Normal gait assessment without ataxia.  Patient has roving eye movements with slight evidence of nystagmus, but not persistent in the horizontal direction.  Do not see vertical nystagmus.  No clonus.  Patient will have twitching movements in all of her extremities while sleeping, that stop when she awakens.         Diagnostics     Lab Results   Labs Ordered and Resulted from Time of ED Arrival to Time of ED Departure   COMPREHENSIVE METABOLIC PANEL - Abnormal       Result Value    Sodium 130 (*)      Potassium 3.8      Carbon Dioxide (CO2) 19 (*)     Anion Gap 19 (*)     Urea Nitrogen 8.8      Creatinine 0.79      GFR Estimate >90      Calcium 10.5 (*)     Chloride 92 (*)     Glucose 77      Alkaline Phosphatase 57       (*)      (*)     Protein Total 8.0      Albumin 4.7      Bilirubin Total 0.7     CBC WITH PLATELETS AND DIFFERENTIAL - Abnormal    WBC Count 7.2      RBC Count 3.32 (*)     Hemoglobin 12.4      Hematocrit 35.5       (*)     MCH 37.3 (*)     MCHC 34.9      RDW 12.6      Platelet Count 252      % Neutrophils 69      % Lymphocytes 15      % Monocytes 13      % Eosinophils 2      % Basophils 0      % Immature Granulocytes 1      NRBCs per 100 WBC 0      Absolute Neutrophils 5.0      Absolute Lymphocytes 1.1      Absolute Monocytes 1.0      Absolute Eosinophils 0.2      Absolute Basophils 0.0      Absolute Immature Granulocytes 0.0      Absolute NRBCs 0.0     URINE DRUG SCREEN PANEL - Abnormal    Amphetamines Urine Screen Positive (*)     Barbituates Urine Screen Negative      Benzodiazepine Urine Screen Negative      Cannabinoids Urine Screen Negative      Cocaine Urine Screen Negative      Fentanyl Qual Urine Screen Negative      Opiates Urine Screen Negative      PCP Urine Screen Negative     ETHYL ALCOHOL LEVEL - Normal    Alcohol ethyl <0.01     N-METHYL-D-ASPARTATE RECEPTOR ANTIBODY IGG CSF   GLUCOSE CSF   PROTEIN TOTAL CSF   CELL COUNT CSF   AMMONIA   NON-GYNECOLOGIC CYTOLOGY   AEROBIC BACTERIAL CULTURE ROUTINE   MENINGITIS/ENCEPHALITIS PANEL QUAL PCR CSF   CELL COUNT WITH DIFFERENTIAL CSF       Imaging   CT Head w/o Contrast   Final Result   IMPRESSION:    HEAD CT:   1.  No CT evidence for acute intracranial process.   2.  Brain atrophy and presumed chronic microvascular ischemic changes as above.      HEAD CTA:    1.  No large vessel occlusion or hemodynamically significant stenosis.      NECK CTA:   1.  No large vessel occlusion or hemodynamically significant  stenosis.      CTA Head Neck w Contrast   Final Result   IMPRESSION:    HEAD CT:   1.  No CT evidence for acute intracranial process.   2.  Brain atrophy and presumed chronic microvascular ischemic changes as above.      HEAD CTA:    1.  No large vessel occlusion or hemodynamically significant stenosis.      NECK CTA:   1.  No large vessel occlusion or hemodynamically significant stenosis.      MR Brain w/o & w Contrast    (Results Pending)       EKG   ECG results from 04/24/25   EKG 12-lead, tracing only     Value    Systolic Blood Pressure     Diastolic Blood Pressure     Ventricular Rate 75    Atrial Rate 75    MA Interval 146    QRS Duration 88        QTc 446    P Axis 33    R AXIS 26    T Axis 8    Interpretation ECG      Normal sinus rhythm  When compared with ECG of 10-David-2024 11:35,  Premature ventricular complexes are no longer Present  Read by Dr. Arndt at 2107        Independent Interpretation   CT head no bleed.    ED Course      Medications Administered   Medications   lidocaine 1 % 10 mL (has no administration in time range)   thiamine (B-1) injection 500 mg (has no administration in time range)   LORazepam (ATIVAN) injection 1 mg (1 mg Intravenous $Given 4/24/25 2030)   iopamidol (ISOVUE-370) solution 67 mL (67 mLs Intravenous $Given 4/24/25 2039)   sodium chloride 0.9 % bag for CT scan flush (100 mLs Intravenous $Given 4/24/25 2039)       Procedures   Procedures     Lumbar Puncture      Procedure: Lumbar Puncture      Indication: confusion     Consent: Written from Family  Risks Discussed (including but not limited to): Infection, Bleeding, Spinal headache with possibility of spinal patch, and Temporary or permanent neurological injury     Universal Protocol: Universal protocol was followed and time out conducted just prior to starting procedure, confirming patient identity, site/side, procedure, patient position, and availability of correct equipment and implants.      Procedure Note:      Patient was placed in a left lateral decubitus position.  The skin overlying the L4-5 area was prepped with povidone-iodine.    The patient was medicated as above.   A 18 gauge spinal needle was used to gain access to the subarachnoid space with stylet in place.  The fluid was clear.   Stylet was replaced and needle withdrawn.      Patient Status:  The patient tolerated the procedure well: Yes. There were no complications.     Discussion of Management   Admitting Hospitalist, Dr. Mahoney    ED Course        Additional Documentation  None    Medical Decision Making / Diagnosis     CMS Diagnoses: None    MIPS       None    MDM   Amber Rocha is a 49 year old female who presents today with acute onsets of visual hallucinations for about a week.  She has past medical history of anxiety and depression, but no history of hallucinations prior. Patient with good insight into hallucinations and seems unlikely to be new onset psychiatric condition at her age. Possible other differentials include medication interaction, Wernicke's encephalopathy, encephalitis, CNS etiology.  Laboratory workup with some mild electrolyte abnormalities, but no significant causes for confusion.  Urine drug screen was positive for amphetamines however often can be caused from taking bupropion.  On recheck patient still with abnormal behavior and also had tremulous movements.  CT head showed no acute intracranial processes.  Proceed with lumbar puncture to for further investigation of causes of encephalitis. Plan to admit patient for further investigation of hallucinations and change in behavior.  Talked with admitting Dr. Mahoney and they agreed to admit the patient to [facility].    Disposition   The patient was admitted to the hospital.     Diagnosis     ICD-10-CM    1. Visual hallucinations  R44.1       2. Transaminitis  R74.01       3. Hyponatremia  E87.1       4. Hypocalcemia  E83.51            Discharge Medications   New Prescriptions    No  medications on file         ILIR Guzman Kevin, MD  04/24/25 6571

## 2025-04-25 NOTE — PLAN OF CARE
"AOX4, RA. Denies pain. Up ind in the room, voiding. Family at bedside.  Possible discharge today.  /73   Pulse 83   Temp 98.4  F (36.9  C) (Axillary)   Resp 18   Ht 1.626 m (5' 4\")   Wt 60.2 kg (132 lb 11.5 oz)   SpO2 100%   BMI 22.78 kg/m        Goal Outcome Evaluation:      Plan of Care Reviewed With: patient    Overall Patient Progress: improvingOverall Patient Progress: improving    Problem: Adult Inpatient Plan of Care  Goal: Plan of Care Review  Description: The Plan of Care Review/Shift note should be completed every shift.  The Outcome Evaluation is a brief statement about your assessment that the patient is improving, declining, or no change.  This information will be displayed automatically on your shiftnote.  Outcome: Progressing  Flowsheets (Taken 4/25/2025 0707)  Plan of Care Reviewed With: patient  Overall Patient Progress: improving  Goal: Patient-Specific Goal (Individualized)  Description: You can add care plan individualizations to a care plan. Examples of Individualization might be:  \"Parent requests to be called daily at 9am for status\", \"I have a hard time hearing out of my right ear\", or \"Do not touch me to wake me up as it startlesme\".  Outcome: Progressing  Goal: Absence of Hospital-Acquired Illness or Injury  Outcome: Progressing  Goal: Optimal Comfort and Wellbeing  Outcome: Progressing  Goal: Readiness for Transition of Care  Outcome: Progressing            "

## 2025-04-25 NOTE — ED NOTES
"Family called RN into room because patient was \"twitchy\" and \"not acting like herself\". When asked, pt stated she is feeling fine \"just tired\". LANA GUSTAFSON notified.   "

## 2025-04-25 NOTE — DISCHARGE SUMMARY
[facility]  Hospitalist Discharge Summary      Date of Admission:  4/24/2025  Date of Discharge:  4/25/2025  Discharging Provider: Abdon Hdz MD  Discharge Service: Hospitalist Service  Primary Care Physician   Zaynab Tsang    Discharge Diagnoses   Delirium  Visual hallucinations  Alcohol dependence  Likely alcohol withdrawal  Essential hypertension  Depression and anxiety      Hospital Course     Amber Rocha is a 49 year old female admitted on 4/24/2025. She was brought to the emergency department by her family because she is disoriented and talking nonsense. Patient lives at home with her two kids and was acting funny.  Her kids called the patient's parents who brought her to the emergency room.  Patient uses alcohol daily, she has not drank in the last 2 or 3 days prior to admission.  Her lab workup revealed hyponatremia, hypochloremia, hypocarbia, anion gap of 19, , .  She has a make-up ocular volume of 107.  She underwent a thorough workup in the emergency department including lumbar puncture without clear diagnosis.  Toxic screen is positive for amphetamine but is on bupropion that can cross react, patient denies drug use.  Family knows about her alcohol dependence but they do not know anything about other recreational drugs if she were using.     Delirium/toxic metabolic encephalopathy.  Alcohol dependence with withdrawal  Unclear etiology but alcoholic patient that stopped drinking 2 or 3 days ago. Tested positive for methamphetamine though she denies use and is on buproprion that can cross react.  She was placed on vitamins incase this is [place]ern[place]es and CIWA protocol.  She returned to her baseline very quickly.  She will be seen by social work for resources to help her stay sober.  She did have an LP that was negative.  She will go home with recommending sobriety and vitamins.  Her CT and MRI were normal      Essential hypertension.  Continue metoprolol and  Irbesartan.       Major depressive disorder, anxiety  Resume her home medications.  Has a scheduled appointment with her therapist next week.     Clinically Significant Risk Factors          Significant Results and Procedures   Most Recent 3 CBC's:  Recent Labs   Lab Test 04/24/25 2005 02/05/25  1037 01/10/24  1220   WBC 7.2 4.9 8.5   HGB 12.4 11.6* 13.7   * 111* 104*    310 212     Most Recent 3 BMP's:  Recent Labs   Lab Test 04/24/25 2005 02/05/25  1037 01/10/24  1220   * 139 138   POTASSIUM 3.8 4.3 4.4   CHLORIDE 92* 103 96*   CO2 19* 21* 24   BUN 8.8 5.7* 5.8*   CR 0.79 0.61 0.59   ANIONGAP 19* 15 18*   NIGEL 10.5* 9.9 9.9   GLC 77 94 118*   ,   Results for orders placed or performed during the hospital encounter of 04/24/25   CTA Head Neck w Contrast    Narrative    EXAM: CTA HEAD NECK W CONTRAST, CT HEAD W/O CONTRAST  LOCATION: [facility]  DATE/TIME: 4/24/2025 8:57 PM CDT    INDICATION: hallucinating, visual changes  COMPARISON: None.  CONTRAST: 67 mL Isovue 370  TECHNIQUE: Head and neck CT angiogram with IV contrast. Noncontrast head CT followed by axial helical CT images of the head and neck vessels obtained during the arterial phase of intravenous contrast administration. Axial 2D reconstructed images and   multiplanar 3D MIP reconstructed images of the head and neck vessels were performed by the technologist. Dose reduction techniques were used. All stenosis measurements made according to NASCET criteria unless otherwise specified.    FINDINGS:   NONCONTRAST HEAD CT:   INTRACRANIAL CONTENTS: No intracranial hemorrhage, extraaxial collection, or mass effect.  No CT evidence of acute infarct. Mild presumed chronic small vessel ischemic changes. Mild generalized volume loss. No hydrocephalus.     VISUALIZED ORBITS/SINUSES/MASTOIDS: No intraorbital abnormality. No significant paranasal sinus mucosal disease. No middle ear or mastoid effusion.    BONES/SOFT TISSUES: No  acute abnormality.    HEAD CTA:  ANTERIOR CIRCULATION:  Ectasia of the right paraclinoid ICA. No stenosis/occlusion or high flow vascular malformation. Standard [place] anatomy.    POSTERIOR CIRCULATION: No stenosis/occlusion, aneurysm, or high flow vascular malformation. Dominant right and smaller left vertebral artery contribute to a normal basilar artery.     DURAL VENOUS SINUSES: Not well evaluated on a technical basis.    NECK CTA:  RIGHT CAROTID: No measurable stenosis or dissection.    LEFT CAROTID: No measurable stenosis or dissection.    VERTEBRAL ARTERIES: No focal stenosis or dissection. Dominant right and smaller left vertebral arteries.    AORTIC ARCH: Classic aortic arch anatomy with no significant stenosis at the origin of the great vessels.    NONVASCULAR STRUCTURES: Unremarkable.      Impression    IMPRESSION:   HEAD CT:  1.  No CT evidence for acute intracranial process.  2.  Brain atrophy and presumed chronic microvascular ischemic changes as above.    HEAD CTA:   1.  No large vessel occlusion or hemodynamically significant stenosis.    NECK CTA:  1.  No large vessel occlusion or hemodynamically significant stenosis.   CT Head w/o Contrast    Narrative    EXAM: CTA HEAD NECK W CONTRAST, CT HEAD W/O CONTRAST  LOCATION: [facility]  DATE/TIME: 4/24/2025 8:57 PM CDT    INDICATION: hallucinating, visual changes  COMPARISON: None.  CONTRAST: 67 mL Isovue 370  TECHNIQUE: Head and neck CT angiogram with IV contrast. Noncontrast head CT followed by axial helical CT images of the head and neck vessels obtained during the arterial phase of intravenous contrast administration. Axial 2D reconstructed images and   multiplanar 3D MIP reconstructed images of the head and neck vessels were performed by the technologist. Dose reduction techniques were used. All stenosis measurements made according to NASCET criteria unless otherwise specified.    FINDINGS:   NONCONTRAST HEAD CT:    INTRACRANIAL CONTENTS: No intracranial hemorrhage, extraaxial collection, or mass effect.  No CT evidence of acute infarct. Mild presumed chronic small vessel ischemic changes. Mild generalized volume loss. No hydrocephalus.     VISUALIZED ORBITS/SINUSES/MASTOIDS: No intraorbital abnormality. No significant paranasal sinus mucosal disease. No middle ear or mastoid effusion.    BONES/SOFT TISSUES: No acute abnormality.    HEAD CTA:  ANTERIOR CIRCULATION:  Ectasia of the right paraclinoid ICA. No stenosis/occlusion or high flow vascular malformation. Standard [place] anatomy.    POSTERIOR CIRCULATION: No stenosis/occlusion, aneurysm, or high flow vascular malformation. Dominant right and smaller left vertebral artery contribute to a normal basilar artery.     DURAL VENOUS SINUSES: Not well evaluated on a technical basis.    NECK CTA:  RIGHT CAROTID: No measurable stenosis or dissection.    LEFT CAROTID: No measurable stenosis or dissection.    VERTEBRAL ARTERIES: No focal stenosis or dissection. Dominant right and smaller left vertebral arteries.    AORTIC ARCH: Classic aortic arch anatomy with no significant stenosis at the origin of the great vessels.    NONVASCULAR STRUCTURES: Unremarkable.      Impression    IMPRESSION:   HEAD CT:  1.  No CT evidence for acute intracranial process.  2.  Brain atrophy and presumed chronic microvascular ischemic changes as above.    HEAD CTA:   1.  No large vessel occlusion or hemodynamically significant stenosis.    NECK CTA:  1.  No large vessel occlusion or hemodynamically significant stenosis.   MR Brain w/o & w Contrast    Narrative    EXAM: MR BRAIN W/O and W CONTRAST  LOCATION: [facility]  DATE: 4/25/2025    INDICATION: ams, abnormal eye movements, twitching movements  COMPARISON: CTA head neck 4/24/2025  CONTRAST: 6 mL Gadavist  TECHNIQUE: Routine multiplanar multisequence head MRI without and with intravenous  contrast.    FINDINGS:  INTRACRANIAL CONTENTS: No acute or subacute infarct. No mass, acute hemorrhage, or extra-axial fluid collections. Scattered nonspecific T2/FLAIR hyperintensities within the cerebral white matter most consistent with mild chronic microvascular ischemic   change. Moderate atrophy for age. Normal position of the cerebellar tonsils. No pathologic contrast enhancement.    SELLA: No abnormality accounting for technique.    OSSEOUS STRUCTURES/SOFT TISSUES: Normal marrow signal. The major intracranial vascular flow voids are maintained.     ORBITS: No abnormality accounting for technique.     SINUSES/MASTOIDS: No paranasal sinus mucosal disease. No middle ear or mastoid effusion.       Impression    IMPRESSION:  1.  No acute findings.  2.  Prominent atrophy for age.              Follow up/instructions: patient to keep her scheduled appointments with her primary care provider in a week and her mental health therapist.      Pending test results at discharge:      Unresulted Labs Ordered in the Past 30 Days of this Admission       Date and Time Order Name Status Description    4/24/2025  9:51 PM Cytology non gyn Tube 4 In process     4/24/2025  9:51 PM Cerebrospinal fluid Aerobic Bacterial Culture Routine With Gram Stain Preliminary     4/24/2025  9:51 PM N-Methyl-D-Aspartate Receptor Antibody IgG CSF: In process              Discharge Orders      Reason for your hospital stay    Alcohol withdrawal     Activity    Your activity upon discharge: activity as tolerated     Follow Up    Follow up with your mental health therapist in a week as scheduled     Discharge Instructions    Stay sober off of all alcohol !!!!!     Diet    Follow this diet upon discharge: Current Diet:Orders Placed This Encounter      Regular Diet Adult     Hospital Follow-up with Existing Primary Care Provider (PCP)            Discharge Disposition   Discharged to home  Condition at discharge: Stable      Consultations This Hospital  Stay   DIAGNOSTIC EVALUATION CENTER (DEC) ASSESSMENT ORDER  CARE MANAGEMENT / SOCIAL WORK IP CONSULT    Code Status   No Order    Time Spent on this Encounter   I, Abdon Hdz MD, personally saw the patient today and spent greater than 30 minutes discharging this patient.  Patient new to me.  Spoke with nursing, social work/case management, parents bedside updated as well      This document was created using voice recognition technology.  Please excuse any typographical errors that may have occurred.  Please call with any questions.       Abdon Hdz MD  [place] [address] MEDICAL SURGICAL  [address] [place] [address]  Phone: [phone]  Fax: [phone]  ______________________________________________________________________    Physical Exam   Vital Signs: Temp: 98.3  F (36.8  C) Temp src: Oral BP: 111/72 Pulse: 87   Resp: 18 SpO2: 100 % O2 Device: None (Room air)    Weight: 132 lbs 11.47 oz    CIWA 2  General appearance: Patient is alert and oriented x3, no apparent distress, pleasant and conversing normally, speaking in full sentences, appears stated age, sitting up in bed, without significant tremor  HEENT:    Mucous membranes are moist  RESPIRATORY: Clear to auscultation bilateral, good air movement  CARDIOVASCULAR: Regular rate and rhythm, normal S1/S2, no murmurs  GASTROINTESTINAL: Non-distended, non-tender, soft, bowel sounds present throughout  NEUROLOGIC:  Cranial nerves II-XII intact, without any focal deficits, strength 5/5 throughout  EXTREMITIES:  Moves all extremities, no clubbing, cyanosis, nor edema, minimal tremor  :  Astorga not present         Discharge Medications   Current Discharge Medication List        START taking these medications    Details   folic acid (FOLVITE) 1 MG tablet Take 1 tablet (1 mg) by mouth daily.  Qty: 30 tablet, Refills: 0    Associated Diagnoses: Alcohol dependence with withdrawal with complication (H)      thiamine (B-1) 100 MG  tablet Take 1 tablet (100 mg) by mouth daily.  Qty: 30 tablet, Refills: 0    Associated Diagnoses: Alcohol dependence with withdrawal with complication (H)           CONTINUE these medications which have NOT CHANGED    Details   atorvastatin (LIPITOR) 20 MG tablet Take 1 tablet (20 mg) by mouth at bedtime.  Qty: 90 tablet, Refills: 3    Associated Diagnoses: Mixed hyperlipidemia      buPROPion (WELLBUTRIN XL) 300 MG 24 hr tablet Take 1 tablet (300 mg) by mouth at bedtime.  Qty: 90 tablet, Refills: 3    Associated Diagnoses: Mild major depression      citalopram (CELEXA) 40 MG tablet Take 1 tablet (40 mg) by mouth daily.  Qty: 90 tablet, Refills: 3    Associated Diagnoses: Mild major depression      etonogestrel (NEXPLANON) 68 MG IMPL 1 each by Subdermal route once.      irbesartan (AVAPRO) 300 MG tablet Take 1 tablet (300 mg) by mouth at bedtime.  Qty: 90 tablet, Refills: 3    Associated Diagnoses: Benign essential hypertension      metoprolol succinate ER (TOPROL XL) 50 MG 24 hr tablet Take 1 tablet (50 mg) by mouth daily.  Qty: 90 tablet, Refills: 3    Associated Diagnoses: Benign essential hypertension      oxyBUTYnin ER (DITROPAN XL) 5 MG 24 hr tablet TAKE 1 TABLET BY MOUTH EVERY DAY  Qty: 90 tablet, Refills: 3    Associated Diagnoses: Urge incontinence of urine           Allergies   Allergies   Allergen Reactions    Lisinopril Cough    Sulfa Antibiotics

## 2025-04-25 NOTE — DISCHARGE INSTRUCTIONS
Substance Use Disorder Direct Access Resources    It is recommended that you abstain from all mood-altering chemicals. Please contact the [facility] support hotline ([phone]) as needed; phones are answered 24 hours a day, 7 days a week.     To access substance use treatment you must have a comprehensive assessment completed to begin any treatment program.     If uninsured, please contact your county of residence for eligibility screen for substance use disorder evaluation and treatment.   [place] - [phone]  [facility] [phone]  [facility] [phone]  [facility] [phone]  [facility] [phone]  [facility] [phone]  [facility] [phone]  [place] - [phone]    If you have private insurance, call the customer service number on the back of your insurance card to find an in-network substance use disorder assessment. The ideal provider will be a treatment facility, licensed in the [facility].     The following facilities also offer Direct Access Substance Use Disorder assessments:    [facility]  [phone]  Mon-Friday: [address] [facility], [address]  Saturday:  [address], [place], [address]  M-F assessments (7a-1:45p); Saturday assessments (7:45-10:45a)    [facility]  [phone]  [address] [place], [place], [address]  *by appointment only M-W; walk ins available Fridays from 10-2.    [facility]  [phone] (phone consultation available 24/7)  In-person Assessments: [address] [facility], [address], [place] [address]  [address] [address] [place] [address] [address] [place], [place] [address] [place] [address]    [facility]  [phone]  [address] [facility], [address], [place], [address]  *walk in and appointments available by calling    [facility]  [phone] [address] [place], [place], [address]  *by appointment only M-Th     [facility]   [phone]  [address], [place], [address]  *walk ins available M-F    [facility]  [phone]  [address] [place], [place], [address]  *available by appointments only    [facility]  [phone]  [address] [place], [place], [address]  *available by appointment only    [facility] ([facility])  [phone]  [address] [address], [place], [place], [address]  *Walk in assessments also available M-F starting at 8 am.    [facility]  [phone]  [address], [place], [address]  *available by appointments    [facility]  [phone]  [address] [place], [place], [address]  *walk in assessments available M-F starting at 7 am.    [facility]  [phone]  [facility]  [address] , [place], [place], [address]  *Walk in assessments available M-F starting at 8 am OR [phone]    [facility]  [address] [facility], [place] [address]  *Walk in assessments available M-F starting at 8 am    [facility]  [phone]  [address] [place] [address]    [facility]  [phone]  [address] [place], [place], [address]  *available by appointment only    [facility]  [phone]  [address] [facility], [place], [place], [address]    [facility]  [phone]  [address], [place], [place], [address]  *available by appointment    [facility]  [phone]  [address], [place], [place], [address]  *walk ins available M-TH from 9-3    [facility]  [phone]  [address] [place], [place], [address]    [place]  [phone] [address] [place] [address] [address] [place] [address]  [email or web address]  [phone]  [address], [address], [place] [address]    [facility]   [email or web address]  [phone], [address], [place], [place] [id number] [phone], [address], [place] [address]  [phone], [address] [facility], [address], [place] [address]  [phone], [address], [address], ([facility]), [place] [address]  [phone], [address] [place] [address]    If you are intoxicated, you may be required to detox at a detox facility before starting treatment. The following are detox facilities where you may seek services:    [facility]: [address] [place], [place], [id number] [phone]  [facility]: [address] [place], [place], [id number] [phone]  [facility]: [address] [place], [place], [id number] [phone]  [facility]: [address] [place], [place], [address] [phone]  [facility]: [address] [place], [place], [address] [phone]     Ways to help cope with sobriety:  Take prescribed medicines as scheduled  Keep follow-up appointments  Talk to others about your concerns  Get regular exercise  Practice deep breathing skills  Eat a healthy diet  Use community resources, including hotline numbers, [facility] crisis and support meetings  Stay sober and avoid places/people/things associated with substance use  Maintain a daily schedule/routine  Get at least 7-8 hours of sleep per night  Create a list 10--20 healthy activities that you can do that are enjoyable and do not involve substance use  Create daily goals (approx. 1-4 goals) per day and work to achieve them throughout the day    [place] [facility] ([facility]): www.[facility].org  [phone]  [facility] connects people seeking recovery to resources that help foster and sustain long-term recovery. Whether you are seeking resources for treatment, transportation, housing, job training, education,  health care or other pathways to recovery, [facility] is a great place to start. (Great listing of all types of recovery and non-recovery related resources)    [facility]: https://www.smartrecRed Stag Farmsy.org/    Alcoholics Anonymous: 1-800-ALCOHOL  HTTP://WWW.AA.ORG/  AA [place] (523-020-5438 or http://a[place].org)  AA [place] ([phone] or www.aastpaul.org)    Narcotics Anonymous: 949.567.4974  www.[employer].    [facility]: [address] [place] [address], [address], [place]  [phone]  Drop-in Hours: Monday-Friday 9-11:30 am. By appointment at other times.  [facility] is a drop-in center on the east side [facility] that provides a safe space for individuals who are homeless and have a history of chemical use. Sobriety is not a requirement but drugs and alcohol are not allowed on the property.  Non-clients can access drop-in services such as Recovery and Harm Reduction Groups, referrals to case management, community activities, shower facilities, and a pool table. Individuals who are homeless and have chemical health needs may be eligible for enrollment into [facility]'s case management program. Clients and  work together to access benefits, treatment, health care, shelter, and external housing resources.     [facility] Chemical Assessment & Referral Unit: [address] [place] [address]  [phone]  Monday-Friday 8 am-5 pm  Substance use disorder comprehensive assessment and referral for individuals seeking treatment or counseling for chemical dependency. Must be a resident of [facility]. There is no fee for assessment. There is some funding available for treatment programs.    [facility]: [facility]  [phone] or [phone]  Monday - Friday 7 am - 5 pm  Daily drop-in substance use disorder comprehensive assessment and weekly mental health assessments and services. Outpatient chemical dependency treatment (with sober  recovery housing), relapse prevention, case management, and employment services. Outpatient and residential treatment for women with children. Specializes in assisting individuals with a history of relapse who face multiple barriers to achieving stable recovery. No charge for most services or services can be billed through insurance. Gender-specific services and treatment for co-occurring substance abuse and mental health concerns offered.    [facility]: [facility], [address] [place] [address], First Floor, [address], [place] [address] (next to the outpatient lab)  [phone]  Serves people ages 16 and older  Open 5 days/week Walk-in hours: Monday, Wednesday, Friday 9:00 am - 11:00 am and 1:00 pm - 3:00 pm and Tuesday, Thursday 1:30 pm - 4:00 pm  Provides bridging services to people with Opiate Use Disorders (OUD) seeking care. This is a front door to Medication Assisted Treatments (MAT), Peer Recovery and Nursing services and referrals to Substance Use Disorder (YOGESH) Assessments.

## 2025-04-25 NOTE — H&P
[facility]    History and Physical - Hospitalist Service       Date of Admission:  4/24/2025    Assessment & Plan   Amber Rocha is a 49 year old female admitted on 4/24/2025. She was brought to the emergency department by her family because she is disoriented and talking nonsense.  Patient uses alcohol daily, she has not drank in the last 2 or 3 days.  Her lab workup revealed hyponatremia, hypochloremia, hypocarbia, anion gap of 19, , .  She has a make-up ocular volume of 107.  She underwent a thorough workup in the emergency department including lumbar puncture without clear diagnosis.  Toxic screen is positive for amphetamine.  Family knows about her alcohol dependence but they do not know anything about other recreational drugs if she were using.    Delirium/toxic metabolic encephalopathy.    Unclear etiology but alcoholic patient that stopped drinking 2 or 3 days ago.  Tested positive for methamphetamine though unclear whether she is a user or not  Given the evidence we have and final results of lab workup I will treat her as alcohol withdrawal syndrome.  I will put her under thiamine protocol for Wernicke.     Admit to inpatient.  CIWA protocol.  Diet as tolerated.  Electrolyte replacement per protocol.  Gabapentin taper per protocol.  Diazepam per protocol symptoms treated.  Thiamine Wernicke's protocol  Fall precautions.  Seizures precaution  PCD    Essential hypertension.  Continue metoprolol and Irbesartan.    Hold on other home medications.    Major depressive disorder.  Treatment on hold for now    Diet:  ADAT  DVT Prophylaxis: Pneumatic Compression Devices  Astorga Catheter: Not present  Lines: None     Cardiac Monitoring: None  Code Status:  FULL CODE    Clinically Significant Risk Factors Present on Admission         # Hyponatremia: Lowest Na = 130 mmol/L in last 2 days, will monitor as appropriate  # Hypochloremia: Lowest Cl = 92 mmol/L in last 2 days, will monitor  as appropriate     # Anion Gap Metabolic Acidosis: Highest Anion Gap = 19 mmol/L in last 2 days, will monitor and treat as appropriate      # Hypertension: Noted on problem list               # Financial/Environmental Concerns: unemployed         Disposition Plan     Medically Ready for Discharge: Anticipated in 2-4 Days           Froy Mahoney MD  Hospitalist Service  [facility]  Securely message with [employer] (more info)  Text page via [facility] Paging/Directory     ______________________________________________________________________    Chief Complaint   Delirium     History is obtained from the patient, electronic health record, and emergency department physician    History of Present Illness   Amber Rocha is a 49 year old female admitted on 4/24/2025. She was brought to the emergency department by her family because she is disoriented and talking nonsense.  Patient uses alcohol daily, she has not drank in the last 2 or 3 days.  Her lab workup revealed hyponatremia, hypochloremia, hypocarbia, anion gap of 19, , .  She has a make-up ocular volume of 107.  She underwent a thorough workup in the emergency department including lumbar puncture without clear diagnosis.  Toxic screen is positive for amphetamine.  Family knows about her alcohol dependence but they do not know anything about other recreational drugs if she were using.      Past Medical History    Past Medical History:   Diagnosis Date    Abnormal maternal glucose tolerance, complicating pregnancy, childbirth, or the puerperium, unspecified as to episode of care     Congenital vascular hamartomas     Port-Wine on Right breast/chest    Depressive disorder     Hypertension        Past Surgical History   Past Surgical History:   Procedure Laterality Date    Z APPENDECTOMY      1996       Prior to Admission Medications   Prior to Admission Medications   Prescriptions Last Dose Informant Patient Reported? Taking?    atorvastatin (LIPITOR) 20 MG tablet   No No   Sig: Take 1 tablet (20 mg) by mouth at bedtime.   buPROPion (WELLBUTRIN XL) 300 MG 24 hr tablet   No No   Sig: Take 1 tablet (300 mg) by mouth at bedtime.   citalopram (CELEXA) 40 MG tablet   No No   Sig: Take 1 tablet (40 mg) by mouth daily.   etonogestrel (NEXPLANON) 68 MG IMPL   Yes No   Si each by Subdermal route once.   irbesartan (AVAPRO) 300 MG tablet   No No   Sig: Take 1 tablet (300 mg) by mouth at bedtime.   metoprolol succinate ER (TOPROL XL) 50 MG 24 hr tablet   No No   Sig: Take 1 tablet (50 mg) by mouth daily.   oxyBUTYnin ER (DITROPAN XL) 5 MG 24 hr tablet   No No   Sig: TAKE 1 TABLET BY MOUTH EVERY DAY      Facility-Administered Medications: None        Review of Systems    The 10 point Review of Systems is negative other than noted in the HPI or here.       Physical Exam   Vital Signs: Temp: 97.5  F (36.4  C) Temp src: Temporal BP: 129/84 Pulse: 86   Resp: 11 SpO2: 97 % O2 Device: None (Room air)    Weight: 0 lbs 0 oz    GEN:  Awakes to voice, not oriented x place and situation, appears comfortable, NAD.  HEENT:  Normocephalic/atraumatic, no scleral icterus, no nasal discharge, mouth moist.  CV:  Regular rate and rhythm, no murmur or JVD.  S1 + S2 noted, no S3 or S4.  LUNGS:  Clear to auscultation bilaterally without rales/rhonchi/wheezing/retractions.  Symmetric chest rise on inhalation noted.  ABD:  Active bowel sounds, soft, non-tender/non-distended.  No rebound/guarding/rigidity.  EXT:  No edema or cyanosis.  No joint synovitis noted.  SKIN:  Dry to touch, no exanthems noted in the visualized areas.  NEURO: hands tremors, words' salad       Medical Decision Making       75 MINUTES SPENT BY ME on the date of service doing chart review, history, exam, documentation & further activities per the note.      Data     I have personally reviewed the following data over the past 24 hrs:    7.2  \   12.4   / 252     130 (L) 92 (L) 8.8 /  77   3.8 19 (L)  0.79 \     ALT: 206 (H) AST: 226 (H) AP: 57 TBILI: 0.7   ALB: 4.7 TOT PROTEIN: 8.0 LIPASE: N/A       Imaging results reviewed over the past 24 hrs:   Recent Results (from the past 24 hours)   CTA Head Neck w Contrast    Narrative    EXAM: CTA HEAD NECK W CONTRAST, CT HEAD W/O CONTRAST  LOCATION: [facility]  DATE/TIME: 4/24/2025 8:57 PM CDT    INDICATION: hallucinating, visual changes  COMPARISON: None.  CONTRAST: 67 mL Isovue 370  TECHNIQUE: Head and neck CT angiogram with IV contrast. Noncontrast head CT followed by axial helical CT images of the head and neck vessels obtained during the arterial phase of intravenous contrast administration. Axial 2D reconstructed images and   multiplanar 3D MIP reconstructed images of the head and neck vessels were performed by the technologist. Dose reduction techniques were used. All stenosis measurements made according to NASCET criteria unless otherwise specified.    FINDINGS:   NONCONTRAST HEAD CT:   INTRACRANIAL CONTENTS: No intracranial hemorrhage, extraaxial collection, or mass effect.  No CT evidence of acute infarct. Mild presumed chronic small vessel ischemic changes. Mild generalized volume loss. No hydrocephalus.     VISUALIZED ORBITS/SINUSES/MASTOIDS: No intraorbital abnormality. No significant paranasal sinus mucosal disease. No middle ear or mastoid effusion.    BONES/SOFT TISSUES: No acute abnormality.    HEAD CTA:  ANTERIOR CIRCULATION:  Ectasia of the right paraclinoid ICA. No stenosis/occlusion or high flow vascular malformation. Standard [place] anatomy.    POSTERIOR CIRCULATION: No stenosis/occlusion, aneurysm, or high flow vascular malformation. Dominant right and smaller left vertebral artery contribute to a normal basilar artery.     DURAL VENOUS SINUSES: Not well evaluated on a technical basis.    NECK CTA:  RIGHT CAROTID: No measurable stenosis or dissection.    LEFT CAROTID: No measurable stenosis or  dissection.    VERTEBRAL ARTERIES: No focal stenosis or dissection. Dominant right and smaller left vertebral arteries.    AORTIC ARCH: Classic aortic arch anatomy with no significant stenosis at the origin of the great vessels.    NONVASCULAR STRUCTURES: Unremarkable.      Impression    IMPRESSION:   HEAD CT:  1.  No CT evidence for acute intracranial process.  2.  Brain atrophy and presumed chronic microvascular ischemic changes as above.    HEAD CTA:   1.  No large vessel occlusion or hemodynamically significant stenosis.    NECK CTA:  1.  No large vessel occlusion or hemodynamically significant stenosis.   CT Head w/o Contrast    Narrative    EXAM: CTA HEAD NECK W CONTRAST, CT HEAD W/O CONTRAST  LOCATION: [facility]  DATE/TIME: 4/24/2025 8:57 PM CDT    INDICATION: hallucinating, visual changes  COMPARISON: None.  CONTRAST: 67 mL Isovue 370  TECHNIQUE: Head and neck CT angiogram with IV contrast. Noncontrast head CT followed by axial helical CT images of the head and neck vessels obtained during the arterial phase of intravenous contrast administration. Axial 2D reconstructed images and   multiplanar 3D MIP reconstructed images of the head and neck vessels were performed by the technologist. Dose reduction techniques were used. All stenosis measurements made according to NASCET criteria unless otherwise specified.    FINDINGS:   NONCONTRAST HEAD CT:   INTRACRANIAL CONTENTS: No intracranial hemorrhage, extraaxial collection, or mass effect.  No CT evidence of acute infarct. Mild presumed chronic small vessel ischemic changes. Mild generalized volume loss. No hydrocephalus.     VISUALIZED ORBITS/SINUSES/MASTOIDS: No intraorbital abnormality. No significant paranasal sinus mucosal disease. No middle ear or mastoid effusion.    BONES/SOFT TISSUES: No acute abnormality.    HEAD CTA:  ANTERIOR CIRCULATION:  Ectasia of the right paraclinoid ICA. No stenosis/occlusion or high flow vascular  malformation. Standard [place] of [place] anatomy.    POSTERIOR CIRCULATION: No stenosis/occlusion, aneurysm, or high flow vascular malformation. Dominant right and smaller left vertebral artery contribute to a normal basilar artery.     DURAL VENOUS SINUSES: Not well evaluated on a technical basis.    NECK CTA:  RIGHT CAROTID: No measurable stenosis or dissection.    LEFT CAROTID: No measurable stenosis or dissection.    VERTEBRAL ARTERIES: No focal stenosis or dissection. Dominant right and smaller left vertebral arteries.    AORTIC ARCH: Classic aortic arch anatomy with no significant stenosis at the origin of the great vessels.    NONVASCULAR STRUCTURES: Unremarkable.      Impression    IMPRESSION:   HEAD CT:  1.  No CT evidence for acute intracranial process.  2.  Brain atrophy and presumed chronic microvascular ischemic changes as above.    HEAD CTA:   1.  No large vessel occlusion or hemodynamically significant stenosis.    NECK CTA:  1.  No large vessel occlusion or hemodynamically significant stenosis.

## 2025-04-25 NOTE — PLAN OF CARE
Amber Rocha  April 24, 2025  Plan of Care Hand-off Note     Patient Recommended Care Path: pt will be admitted medically    Clinical Substantiation:  Pt presents to [facility] ED this evening due to concerns for acute onset of visual hallucinations, confusion and disorganized thoughts and speech for approximately 1 week. Pt reports she has never experienced symptoms like this before. Pt has also been experiencing emesis and insomnia, though she reports the insomnia is not new. No previous hx of psychosis noted in chart. Pt is calm, cooperative and appears to have good insight into her symptoms. Pt denies current SI, HI, NSSI, or AH. Does endorse regular alcohol use and reports to [facility] that her last drink was on Tuesday. In consultation with pt's attending MD, there is concern that pt's current presentation may be due to possible medication interaction, Wernicke's, or other medical condition. Notes indicate onset of twitchy and tremulous movements while in ED in addition to other previously mentioned symptoms. Per pt's attending,  pt will be admitted medically at this time for safety and further investigation into symptoms and change in behavior. If symptoms do not resolve, would recommend psychiatry consultation. Once pt has stabilized for discharge, she does have established outpatient therapy and medication management she is able to follow up with.    Goals for crisis stabilization:  stabilization and reduction of symptoms    Next steps for Care Team:  plan for medical admission for stabilization. If symptoms do not resolve, would recommend psychiatry consultation.    Treatment Objectives Addressed:  rapport building, orienting the patient to therapy, processing feelings, assessing safety, identifying additional supports    Therapeutic Interventions:  Engaged in safety planning    Has a specific means been identified for suicidal.homicide actions: No    Patient coping skills attempted to reduce the crisis:  Pt came  to the ED with her parents who are a good support for her. Able to engage in assessments and agreeable to recommendations.      Collateral contact information:  Alexa and Tomas, parents, present during time of assessment    Legal Status: Voluntary/Patient has signed consent for treatment                           Reviewed court records: yes     Psychiatry Consult:     KIMBERLY Gonzalez

## 2025-04-25 NOTE — CONSULTS
"Diagnostic Evaluation Consultation  Crisis Assessment    Patient Name: Amber Rocha  Age:  49 year old  Legal Sex: female  Gender Identity: female  Pronouns:   Race: White  Ethnicity: Not  or   Language: English      Patient was assessed: In person   Crisis Assessment Start Date: 04/24/25  Crisis Assessment Start Time: 1950  Crisis Assessment Stop Time: 2007  Patient location: [facility] Emergency Dept                             [id number]    Referral Data and Chief Complaint  Amber Rocha presents to the ED with family/friends. Patient is presenting to the ED for the following concerns: Other (see comment) (visual hallucinations, confusion, disorganized thought and speech). Factors that make the mental health crisis life threatening or complex are: Pt presents to [facility] ED accompanied by her parents for an evaluation due to concerns for hallucinations, disorganized thought and speech. Pt reports that she thinks symptoms have been present for the past week, but reports she has also been experiencing some increased confusion and difficulty remembering things, and notes that her parents report symptoms have been present for more like 2-3 days from what they have observed. Pt denies any current SI, HI, NSSI, or AH. Pt reports seeing spots on the wall moving at the time of assessment. She reports she has been seeing people who are not there and gave an example that earlier today she thought 8 people had walked into her home, but it was only 2. Her mother shares that pt ran into her daughter's room today asking where her sister was and her daughter was confused as she does not have a sister. Pt has one adult son and one adult daughter. When pt's children called pt's parents to the home, they shared that pt made a comment that if she had known \"the boys\" were coming to the house, she would have gone to [employer], but there were no boys in the home. Reports increased difficulty finding her " "words or mixing up her words when talking with others. She reports her adult daughter who lives with her has asked her if she remembers falling or hitting her head. Pt reports she does not think she has had any recent head injuries, but reports it could be possible due to the recent memory difficulty. Her daughter told her she has not seen pt fall or hit her head when she has been with her. Patient has been on citalopram and bupropion for a while now and reports she has been taking these as prescribed. She reports she did recently start the oxybutynin about 2 months ago. Patient reports that she took Benadryl for 2 nights due to insomnia prior to noticing the onset of these hallucinations. Pt reports she has never experienced these symptoms before, and there is no known family hx of bipolar disorder or schizophrenia. She does endorse alcohol use \"more than I should\" and reports drinking almost daily, with last drink being Tuesday. Reports she usually drinks vodka or wine. Denies any other substance use.    Informed Consent and Assessment Methods  Explained the crisis assessment process, including applicable information disclosures and limits to confidentiality, assessed understanding of the process, and obtained consent to proceed with the assessment.  Assessment methods included conducting a formal interview with patient, review of medical records, collaboration with medical staff, and obtaining relevant collateral information from family and community providers when available.  : done     History of the Crisis   Pt has a hx of anxiety and depression. No previous hx of IP MH or IOP. No previous hx of suicide attempts. Pt reports she has a therapist she sees bi-weekly. She reports SOPHY Bernal CNP through [facility] is prescribing her medication currently. She reports she has had one appointment with her so far, thus has not had a chance to update her on these symptoms, but reports she has a " follow-up appointment with her next Wednesday.    Brief Psychosocial History  Family:  Single, Children yes (Pt reports she has an adult son (19) and adult daughter (21) who live with her)  Support System:  Children, Parent(s)  Employment Status:  unemployed (pt reports she was laid off in August)  Source of Income:  unemployment  Financial Environmental Concerns:  unemployed  Current Hobbies:  family functions  Barriers in Personal Life:  other (see comments) (alcohol use concerns, new onset of hallucinations and confusion with concern related to medical condition or alcohol withdrawal)    Significant Clinical History  Current Anxiety Symptoms:  anxious  Current Depression/Trauma:   (none currently observed or reported)  Current Somatic Symptoms:  anxious  Current Psychosis/Thought Disturbance:  forgetful, visual hallucinations (confusion, disorganized though and speech)  Current Eating Symptoms:   (no change reported)  Chemical Use History:  Alcohol: Daily  Last Use:: 04/22/25  Benzodiazepines: None  Opiates: None  Cocaine: None  Marijuana: None  Other Use: None  Withdrawal Symptoms:  (not reported)  Addictions:  (none reported)   Past diagnosis:  Anxiety Disorder, Depression  Family history:  No known history of mental health or chemical health concerns  Past treatment:  Individual therapy, Psychiatric Medication Management  Details of most recent treatment:  Pt reports she has a therapist she sees bi-weekly. She reports SOPHY Bernal CNP through [facility] is her current medication management provider. Reports she has been compliant with her medication.  Other relevant history:       Have there been any medication changes in the past two weeks:  no (oxybutynin - started on 2 months ago)       Is the patient compliant with medications:  yes        Collateral Information  Is there collateral information: Yes     Collateral information name, relationship, phone number:  Melo, parents,  present during time of assessment    What happened today: Reports they drove to pt's home from [place] when pt's children called them out of concern for pt. Brought pt in this evening. Supportive. Reports current presentation is not like patient at all, they have never seen these symptoms in patient before. See embedded collateral above in narrative.     What is different about patient's functioning: Hallucinations, disorganized, not like herself     Has patient made comments about wanting to kill themselves/others: no    If d/c is recommended, can they take part in safety/aftercare planning:  yes     Risk Assessment  [place] Suicide Severity Rating Scale Full Clinical Version:  Suicidal Ideation  Q1 Wish to be Dead (Lifetime): No  Q2 Non-Specific Active Suicidal Thoughts (Lifetime): No  Q6 Suicide Behavior (Lifetime): no     Suicidal Behavior (Lifetime)  Actual Attempt (Lifetime): No  Has subject engaged in non-suicidal self-injurious behavior? (Lifetime): No  Interrupted Attempts (Lifetime): No  Aborted or Self-Interrupted Attempt (Lifetime): No  Preparatory Acts or Behavior (Lifetime): No    [place] Suicide Severity Rating Scale Recent:   Suicidal Ideation (Recent)  Q1 Wished to be Dead (Past Month): no  Q2 Suicidal Thoughts (Past Month): no  Level of Risk per Screen: no risks indicated     Suicidal Behavior (Recent)  Actual Attempt (Past 3 Months): No  Total Number of Actual Attempts (Past 3 Months): 0  Has subject engaged in non-suicidal self-injurious behavior? (Past 3 Months): No  Interrupted Attempts (Past 3 Months): No  Total Number of Interrupted Attempts (Past 3 Months): 0  Aborted or Self-Interrupted Attempt (Past 3 Months): No  Total Number of Aborted or Self-Interrupted Attempts (Past 3 Months): 0  Preparatory Acts or Behavior (Past 3 Months): No  Total Number of Preparatory Acts (Past 3 Months): 0    Environmental or Psychosocial Events: unemployment/underemployment, ongoing abuse of  substances  Protective Factors: Protective Factors: strong bond to family unit, community support, or employment, lives in a responsibly safe and stable environment, help seeking, supportive ongoing medical and mental health care relationships, responsibilities and duties to others, including pets and children    Does the patient have thoughts of harming others? Feels Like Hurting Others: no  Previous Attempt to Hurt Others: no  Current presentation:  (calm and cooperative)  Is the patient engaging in sexually inappropriate behavior?: no  Does Patient have a known history of aggressive behavior: No  Has aggression occurred as a result of MH concerns/diagnosis: no, NA  Does patient have history of aggression in hospital: no    Is the patient engaging in sexually inappropriate behavior?  no        Mental Status Exam   Affect: Appropriate  Appearance: Appropriate  Attention Span/Concentration: Attentive  Eye Contact: Engaged    Fund of Knowledge: Appropriate   Language /Speech Content: Fluent  Language /Speech Volume: Normal  Language /Speech Rate/Productions: Normal  Recent Memory: Variable  Remote Memory: Intact  Mood: Normal  Orientation to Person: Yes   Orientation to Place: Yes  Orientation to Time of Day: Yes  Orientation to Date: Yes     Situation (Do they understand why they are here?): Yes  Psychomotor Behavior: Normal  Thought Content: Hallucinations  Thought Form: Intact     Medication  Psychotropic medications:   Medication Orders - Psychiatric (From admission, onward)      None          Current Facility-Administered Medications   Medication Dose Route Frequency Provider Last Rate Last Admin    lidocaine 1 % 10 mL  10 mL Subcutaneous Once Semaj Arndt MD        thiamine (B-1) injection 500 mg  500 mg Intravenous Once Semaj Arndt MD         Current Outpatient Medications   Medication Sig Dispense Refill    atorvastatin (LIPITOR) 20 MG tablet Take 1 tablet (20 mg) by mouth at bedtime. 90 tablet 3     buPROPion (WELLBUTRIN XL) 300 MG 24 hr tablet Take 1 tablet (300 mg) by mouth at bedtime. 90 tablet 3    citalopram (CELEXA) 40 MG tablet Take 1 tablet (40 mg) by mouth daily. 90 tablet 3    etonogestrel (NEXPLANON) 68 MG IMPL 1 each by Subdermal route once.      irbesartan (AVAPRO) 300 MG tablet Take 1 tablet (300 mg) by mouth at bedtime. 90 tablet 3    metoprolol succinate ER (TOPROL XL) 50 MG 24 hr tablet Take 1 tablet (50 mg) by mouth daily. 90 tablet 3    oxyBUTYnin ER (DITROPAN XL) 5 MG 24 hr tablet TAKE 1 TABLET BY MOUTH EVERY DAY 90 tablet 3      Current Care Team  Patient Care Team:  Amparo Tsang APRN CNP as PCP - General  Amparo Tsang APRN CNP as Assigned PCP    Diagnosis  Patient Active Problem List   Diagnosis Code    Herpes zoster B02.9    Insomnia G47.00    CARDIOVASCULAR SCREENING; LDL GOAL LESS THAN 160 Z13.6    Cervical high risk HPV (human papillomavirus) test positive R87.810    Depression, major, recurrent, in complete remission F33.42    Elevated liver enzymes R74.8    Essential hypertension I10    History of gestational diabetes Z86.32    Migraine with aura and without status migrainosus, not intractable G43.109    Subclinical hypothyroidism E03.8    Hallucinations R44.3    Delirium R41.0    Depression F32.A       Primary Problem This Admission  Active Hospital Problems    Hallucinations  R44.3      Delirium R41.0      Depression F32.A - by history      Clinical Summary and Substantiation of Recommendations   Clinical Substantiation:  Pt presents to [facility] ED this evening due to concerns for acute onset of visual hallucinations, confusion and disorganized thoughts and speech for approximately 1 week. Pt reports she has never experienced symptoms like this before. Pt has also been experiencing emesis and insomnia, though she reports the insomnia is not new. No previous hx of psychosis noted in chart. Pt is calm, cooperative and appears to have good insight into her symptoms. Pt  denies current SI, HI, NSSI, or AH. Does endorse regular alcohol use and reports to [facility] that her last drink was on Tuesday. In consultation with pt's attending MD, there is concern that pt's current presentation may be due to possible medication interaction, Wernicke's, or other medical condition. Notes indicate onset of twitchy and tremulous movements while in ED in addition to other previously mentioned symptoms. Per pt's attending,  pt will be admitted medically at this time for safety and further investigation into symptoms and change in behavior. If symptoms do not resolve, would recommend psychiatry consultation. Once pt has stabilized for discharge, she does have established outpatient therapy and medication management she is able to follow up with.    Goals for crisis stabilization:  stabilization and reduction of symptoms    Next steps for Care Team:  plan for medical admission for stabilization. If symptoms do not resolve, would recommend psychiatry consultation.    Treatment Objectives Addressed:  rapport building, orienting the patient to therapy, processing feelings, assessing safety, identifying additional supports    Therapeutic Interventions:  Engaged in safety planning    Has a specific means been identified for suicidal/homicide actions: No      Patient coping skills attempted to reduce the crisis:  Pt came to the ED with her parents who are a good support for her. Able to engage in assessments and agreeable to recommendations.    Disposition  Recommended referrals: Individual Therapy, Medication Management        Reviewed case and recommendations with attending provider. Attending Name: Dr. Semaj Arndt       Attending concurs with disposition: yes       Patient and/or validated legal guardian concurs with disposition:   yes       Final disposition:  discharge (pt will be admitted medically)      Legal status: Voluntary/Patient has signed consent for treatment                                                      Reviewed court records: yes       Assessment Details   Total duration spent with the patient: 17 min     CPT code(s) utilized: 34997 - Psychotherapy (with patient) - 30 (16-37*) min    KIMBERLY Gonzalez, Psychotherapist  [facility] - Triage & Transition Services  Callback: [phone]

## 2025-04-25 NOTE — PROGRESS NOTES
DC instructions given to pt and parents, verbalized understanding.  All belongings with pt, IV DC'd and documented.

## 2025-04-27 LAB — NMDAR IGG TITR CSF IF: NORMAL {TITER}

## 2025-04-28 LAB
BACTERIA CSF CULT: ABNORMAL
GRAM STAIN RESULT: ABNORMAL
GRAM STAIN RESULT: ABNORMAL

## 2025-04-29 ENCOUNTER — PATIENT OUTREACH (OUTPATIENT)
Dept: FAMILY MEDICINE | Facility: CLINIC | Age: 50
End: 2025-04-29
Payer: COMMERCIAL

## 2025-04-29 LAB
PATH REPORT.COMMENTS IMP SPEC: NORMAL
PATH REPORT.FINAL DX SPEC: NORMAL
PATH REPORT.GROSS SPEC: NORMAL
PATH REPORT.MICROSCOPIC SPEC OTHER STN: NORMAL
PATH REPORT.RELEVANT HX SPEC: NORMAL

## 2025-04-29 NOTE — TELEPHONE ENCOUNTER
Transitions of Care Outreach  Chief Complaint   Patient presents with    Hospital F/U     Hospital Discharge 4/25/2025       Most Recent Admission Date: 4/24/2025   Most Recent Admission Diagnosis: Hypocalcemia - E83.51  Hyponatremia - E87.1  Visual hallucinations - R44.1  Transaminitis - R74.01     Most Recent Discharge Date: 4/25/2025   Most Recent Discharge Diagnosis: Visual hallucinations - R44.1  Transaminitis - R74.01  Hyponatremia - E87.1  Hypocalcemia - E83.51  Alcohol dependence with withdrawal with complication (H) - F10.239     Transitions of Care Assessment    Discharge Assessment  How are you doing now that you are home?: I am good, all symptoms,  How are your symptoms? (Red Flag symptoms escalate to triage hotline per guidelines): Improved  Do you know how to contact your clinic care team if you have future questions or changes to your health status? : Yes  Does the patient have their discharge instructions? : Yes  Does the patient have questions regarding their discharge instructions? : No  Were you started on any new medications or were there changes to any of your previous medications? : Yes (Vitamin)  Does the patient have all of their medications?: Yes  Do you have questions regarding any of your medications? : No  Do you have all of your needed medical supplies or equipment (DME)?  (i.e. oxygen tank, CPAP, cane, etc.): Yes    Follow up Plan     Discharge Follow-Up  Discharge follow up appointment scheduled in alignment with recommended follow up timeframe or Transitions of Risk Category? (Low = within 30 days; Moderate= within 14 days; High= within 7 days): Yes  Discharge Follow Up Appointment Scheduled with?: Specialty Care Provider    Future Appointments   Date Time Provider Department Center   5/7/2025 11:30 AM Amparo Tsang APRN CNP [facility]       Outpatient Plan as outlined on AVS reviewed with patient.    For any urgent concerns, please contact our 24 hour nurse triage line:  [phone] ([phone])       Amber says she has mental health follow up declines to schedule with PCP for that reason.   OLENA Chatman

## 2025-04-29 NOTE — ED PROVIDER NOTES
CSF culture grew staph epi and diphtheroids.  I did run this by infectious Dr. Bowen and this is a contaminant.  Furthermore I called the patient and she feels great.  She has no headache, neck pain, fever or any other symptoms.     Semaj Arndt MD  04/29/25 0931

## 2025-05-07 ENCOUNTER — OFFICE VISIT (OUTPATIENT)
Dept: FAMILY MEDICINE | Facility: CLINIC | Age: 50
End: 2025-05-07
Payer: COMMERCIAL

## 2025-05-07 VITALS
TEMPERATURE: 98.4 F | HEIGHT: 64 IN | RESPIRATION RATE: 12 BRPM | BODY MASS INDEX: 23 KG/M2 | SYSTOLIC BLOOD PRESSURE: 99 MMHG | DIASTOLIC BLOOD PRESSURE: 60 MMHG | HEART RATE: 77 BPM | WEIGHT: 134.7 LBS | OXYGEN SATURATION: 97 %

## 2025-05-07 DIAGNOSIS — F10.239 ALCOHOL DEPENDENCE WITH WITHDRAWAL WITH COMPLICATION (H): ICD-10-CM

## 2025-05-07 DIAGNOSIS — N39.41 URGE INCONTINENCE OF URINE: ICD-10-CM

## 2025-05-07 DIAGNOSIS — R74.8 ELEVATED LIVER ENZYMES: ICD-10-CM

## 2025-05-07 DIAGNOSIS — L20.89 FLEXURAL ATOPIC DERMATITIS: ICD-10-CM

## 2025-05-07 DIAGNOSIS — Z00.00 ROUTINE GENERAL MEDICAL EXAMINATION AT A HEALTH CARE FACILITY: Primary | ICD-10-CM

## 2025-05-07 DIAGNOSIS — D64.9 ANEMIA, UNSPECIFIED TYPE: ICD-10-CM

## 2025-05-07 LAB
ALBUMIN SERPL BCG-MCNC: 4.2 G/DL (ref 3.5–5.2)
ALP SERPL-CCNC: 35 U/L (ref 40–150)
ALT SERPL W P-5'-P-CCNC: 64 U/L (ref 0–50)
ANION GAP SERPL CALCULATED.3IONS-SCNC: 12 MMOL/L (ref 7–15)
AST SERPL W P-5'-P-CCNC: 52 U/L (ref 0–45)
BILIRUB SERPL-MCNC: 0.4 MG/DL
BUN SERPL-MCNC: 9 MG/DL (ref 6–20)
CALCIUM SERPL-MCNC: 10.7 MG/DL (ref 8.8–10.4)
CHLORIDE SERPL-SCNC: 98 MMOL/L (ref 98–107)
CREAT SERPL-MCNC: 0.76 MG/DL (ref 0.51–0.95)
EGFRCR SERPLBLD CKD-EPI 2021: >90 ML/MIN/1.73M2
ERYTHROCYTE [DISTWIDTH] IN BLOOD BY AUTOMATED COUNT: 11.8 % (ref 10–15)
FERRITIN SERPL-MCNC: 213 NG/ML (ref 6–175)
FERRITIN SERPL-MCNC: 221 NG/ML (ref 6–175)
FOLATE SERPL-MCNC: 24.4 NG/ML (ref 4.6–34.8)
GLUCOSE SERPL-MCNC: 117 MG/DL (ref 70–99)
HCO3 SERPL-SCNC: 23 MMOL/L (ref 22–29)
HCT VFR BLD AUTO: 36.1 % (ref 35–47)
HGB BLD-MCNC: 12.4 G/DL (ref 11.7–15.7)
HOLD SPECIMEN: NORMAL
IRON BINDING CAPACITY (ROCHE): 279 UG/DL (ref 240–430)
IRON BINDING CAPACITY (ROCHE): 304 UG/DL (ref 240–430)
IRON SATN MFR SERPL: 23 % (ref 15–46)
IRON SATN MFR SERPL: 26 % (ref 15–46)
IRON SERPL-MCNC: 71 UG/DL (ref 37–145)
IRON SERPL-MCNC: 72 UG/DL (ref 37–145)
MAGNESIUM SERPL-MCNC: 1.7 MG/DL (ref 1.7–2.3)
MCH RBC QN AUTO: 35.9 PG (ref 26.5–33)
MCHC RBC AUTO-ENTMCNC: 34.3 G/DL (ref 31.5–36.5)
MCV RBC AUTO: 105 FL (ref 78–100)
PLATELET # BLD AUTO: 479 10E3/UL (ref 150–450)
POTASSIUM SERPL-SCNC: 4.5 MMOL/L (ref 3.4–5.3)
PROT SERPL-MCNC: 7.3 G/DL (ref 6.4–8.3)
RBC # BLD AUTO: 3.45 10E6/UL (ref 3.8–5.2)
SODIUM SERPL-SCNC: 133 MMOL/L (ref 135–145)
VIT B12 SERPL-MCNC: 426 PG/ML (ref 232–1245)
WBC # BLD AUTO: 7.3 10E3/UL (ref 4–11)

## 2025-05-07 PROCEDURE — 3074F SYST BP LT 130 MM HG: CPT

## 2025-05-07 PROCEDURE — 80053 COMPREHEN METABOLIC PANEL: CPT

## 2025-05-07 PROCEDURE — 99396 PREV VISIT EST AGE 40-64: CPT

## 2025-05-07 PROCEDURE — 82728 ASSAY OF FERRITIN: CPT

## 2025-05-07 PROCEDURE — 83550 IRON BINDING TEST: CPT

## 2025-05-07 PROCEDURE — 1126F AMNT PAIN NOTED NONE PRSNT: CPT

## 2025-05-07 PROCEDURE — 83540 ASSAY OF IRON: CPT

## 2025-05-07 PROCEDURE — 36415 COLL VENOUS BLD VENIPUNCTURE: CPT

## 2025-05-07 PROCEDURE — 82607 VITAMIN B-12: CPT

## 2025-05-07 PROCEDURE — 83735 ASSAY OF MAGNESIUM: CPT

## 2025-05-07 PROCEDURE — 85027 COMPLETE CBC AUTOMATED: CPT

## 2025-05-07 PROCEDURE — 82746 ASSAY OF FOLIC ACID SERUM: CPT

## 2025-05-07 PROCEDURE — 99214 OFFICE O/P EST MOD 30 MIN: CPT | Mod: 25

## 2025-05-07 PROCEDURE — G2211 COMPLEX E/M VISIT ADD ON: HCPCS

## 2025-05-07 PROCEDURE — 3078F DIAST BP <80 MM HG: CPT

## 2025-05-07 RX ORDER — TRIAMCINOLONE ACETONIDE 1 MG/G
CREAM TOPICAL 2 TIMES DAILY
Qty: 30 G | Refills: 0 | Status: SHIPPED | OUTPATIENT
Start: 2025-05-07

## 2025-05-07 SDOH — HEALTH STABILITY: PHYSICAL HEALTH: ON AVERAGE, HOW MANY DAYS PER WEEK DO YOU ENGAGE IN MODERATE TO STRENUOUS EXERCISE (LIKE A BRISK WALK)?: 2 DAYS

## 2025-05-07 SDOH — HEALTH STABILITY: PHYSICAL HEALTH: ON AVERAGE, HOW MANY MINUTES DO YOU ENGAGE IN EXERCISE AT THIS LEVEL?: 20 MIN

## 2025-05-07 ASSESSMENT — PATIENT HEALTH QUESTIONNAIRE - PHQ9
10. IF YOU CHECKED OFF ANY PROBLEMS, HOW DIFFICULT HAVE THESE PROBLEMS MADE IT FOR YOU TO DO YOUR WORK, TAKE CARE OF THINGS AT HOME, OR GET ALONG WITH OTHER PEOPLE: SOMEWHAT DIFFICULT
SUM OF ALL RESPONSES TO PHQ QUESTIONS 1-9: 7
SUM OF ALL RESPONSES TO PHQ QUESTIONS 1-9: 7

## 2025-05-07 ASSESSMENT — PAIN SCALES - GENERAL: PAINLEVEL_OUTOF10: NO PAIN (0)

## 2025-05-07 ASSESSMENT — SOCIAL DETERMINANTS OF HEALTH (SDOH): HOW OFTEN DO YOU GET TOGETHER WITH FRIENDS OR RELATIVES?: ONCE A WEEK

## 2025-05-07 NOTE — PATIENT INSTRUCTIONS
Patient Education   Preventive Care Advice   This is general advice given by our system to help you stay healthy. However, your care team may have specific advice just for you. Please talk to your care team about your preventive care needs.  Nutrition  Eat 5 or more servings of fruits and vegetables each day.  Try wheat bread, brown rice and whole grain pasta (instead of white bread, rice, and pasta).  Get enough calcium and vitamin D. Check the label on foods and aim for 100% of the RDA (recommended daily allowance).  Lifestyle  Exercise at least 150 minutes each week  (30 minutes a day, 5 days a week).  Do muscle strengthening activities 2 days a week. These help control your weight and prevent disease.  No smoking.  Wear sunscreen to prevent skin cancer.  Have a dental exam and cleaning every 6 months.  Yearly exams  See your health care team every year to talk about:  Any changes in your health.  Any medicines your care team has prescribed.  Preventive care, family planning, and ways to prevent chronic diseases.  Shots (vaccines)   HPV shots (up to age 26), if you've never had them before.  Hepatitis B shots (up to age 59), if you've never had them before.  COVID-19 shot: Get this shot when it's due.  Flu shot: Get a flu shot every year.  Tetanus shot: Get a tetanus shot every 10 years.  Pneumococcal, hepatitis A, and RSV shots: Ask your care team if you need these based on your risk.  Shingles shot (for age 50 and up)  General health tests  Diabetes screening:  Starting at age 35, Get screened for diabetes at least every 3 years.  If you are younger than age 35, ask your care team if you should be screened for diabetes.  Cholesterol test: At age 39, start having a cholesterol test every 5 years, or more often if advised.  Bone density scan (DEXA): At age 50, ask your care team if you should have this scan for osteoporosis (brittle bones).  Hepatitis C: Get tested at least once in your life.  STIs (sexually  transmitted infections)  Before age 24: Ask your care team if you should be screened for STIs.  After age 24: Get screened for STIs if you're at risk. You are at risk for STIs (including HIV) if:  You are sexually active with more than one person.  You don't use condoms every time.  You or a partner was diagnosed with a sexually transmitted infection.  If you are at risk for HIV, ask about PrEP medicine to prevent HIV.  Get tested for HIV at least once in your life, whether you are at risk for HIV or not.  Cancer screening tests  Cervical cancer screening: If you have a cervix, begin getting regular cervical cancer screening tests starting at age 21.  Breast cancer scan (mammogram): If you've ever had breasts, begin having regular mammograms starting at age 40. This is a scan to check for breast cancer.  Colon cancer screening: It is important to start screening for colon cancer at age 45.  Have a colonoscopy test every 10 years (or more often if you're at risk) Or, ask your provider about stool tests like a FIT test every year or Cologuard test every 3 years.  To learn more about your testing options, visit:   .  For help making a decision, visit:   https://bit.ly/um73596.  Prostate cancer screening test: If you have a prostate, ask your care team if a prostate cancer screening test (PSA) at age 55 is right for you.  Lung cancer screening: If you are a current or former smoker ages 50 to 80, ask your care team if ongoing lung cancer screenings are right for you.  For informational purposes only. Not to replace the advice of your health care provider. Copyright   2023 [facility]. All rights reserved. Clinically reviewed by the [facility]. TASS 293935 - REV 01/24.  Learning About Stress  What is stress?     Stress is your body's response to a hard situation. Your body can have a physical, emotional, or mental response. Stress is a fact of life for most people, and it  affects everyone differently. What causes stress for you may not be stressful for someone else.  A lot of things can cause stress. You may feel stress when you go on a job interview, take a test, or run a race. This kind of short-term stress is normal and even useful. It can help you if you need to work hard or react quickly. For example, stress can help you finish an important job on time.  Long-term stress is caused by ongoing stressful situations or events. Examples of long-term stress include long-term health problems, ongoing problems at work, or conflicts in your family. Long-term stress can harm your health.  How does stress affect your health?  When you are stressed, your body responds as though you are in danger. It makes hormones that speed up your heart, make you breathe faster, and give you a burst of energy. This is called the fight-or-flight stress response. If the stress is over quickly, your body goes back to normal and no harm is done.  But if stress happens too often or lasts too long, it can have bad effects. Long-term stress can make you more likely to get sick, and it can make symptoms of some diseases worse. If you tense up when you are stressed, you may develop neck, shoulder, or low back pain. Stress is linked to high blood pressure and heart disease.  Stress also harms your emotional health. It can make you chavez, tense, or depressed. Your relationships may suffer, and you may not do well at work or school.  What can you do to manage stress?  You can try these things to help manage stress:   Do something active. Exercise or activity can help reduce stress. Walking is a great way to get started. Even everyday activities such as housecleaning or yard work can help.  Try yoga or kim chi. These techniques combine exercise and meditation. You may need some training at first to learn them.  Do something you enjoy. For example, listen to music or go to a movie. Practice your hobby or do volunteer  "work.  Meditate. This can help you relax, because you are not worrying about what happened before or what may happen in the future.  Do guided imagery. Imagine yourself in any setting that helps you feel calm. You can use online videos, books, or a teacher to guide you.  Do breathing exercises. For example:  From a standing position, bend forward from the waist with your knees slightly bent. Let your arms dangle close to the floor.  Breathe in slowly and deeply as you return to a standing position. Roll up slowly and lift your head last.  Hold your breath for just a few seconds in the standing position.  Breathe out slowly and bend forward from the waist.  Let your feelings out. Talk, laugh, cry, and express anger when you need to. Talking with supportive friends or family, a counselor, or a diana leader about your feelings is a healthy way to relieve stress. Avoid discussing your feelings with people who make you feel worse.  Write. It may help to write about things that are bothering you. This helps you find out how much stress you feel and what is causing it. When you know this, you can find better ways to cope.  What can you do to prevent stress?  You might try some of these things to help prevent stress:  Manage your time. This helps you find time to do the things you want and need to do.  Get enough sleep. Your body recovers from the stresses of the day while you are sleeping.  Get support. Your family, friends, and community can make a difference in how you experience stress.  Limit your news feed. Avoid or limit time on social media or news that may make you feel stressed.  Do something active. Exercise or activity can help reduce stress. Walking is a great way to get started.  Where can you learn more?  Go to https://www.[employer].net/patiented  Enter N032 in the search box to learn more about \"Learning About Stress.\"  Current as of: October 24, 2024  Content Version: 14.4 2024-2025 [employer].   Care instructions adapted under license by your healthcare professional. If you have questions about a medical condition or this instruction, always ask your healthcare professional. [employer] disclaims any warranty or liability for your use of this information.    Learning About Depression Screening  What is depression screening?  Depression screening is a way to see if you have depression symptoms. It may be done by a doctor or counselor. It's often part of a routine checkup. That's because your mental health is just as important as your physical health.  Depression is a mental health condition that affects how you feel, think, and act. You may:  Have less energy.  Lose interest in your daily activities.  Feel sad and grouchy for a long time.  Depression is very common. It affects people of all ages.  Many things can lead to depression. Some people become depressed after they have a stroke or find out they have a major illness like cancer or heart disease. The death of a loved one or a breakup may lead to depression. It can run in families. Most experts believe that a combination of inherited genes and stressful life events can cause it.  What happens during screening?  You may be asked to fill out a form about your depression symptoms. You and the doctor will discuss your answers. The doctor may ask you more questions to learn more about how you think, act, and feel.  What happens after screening?  If you have symptoms of depression, your doctor will talk to you about your options.  Doctors usually treat depression with medicines or counseling. Often, combining the two works best. Many people don't get help because they think that they'll get over the depression on their own. But people with depression may not get better unless they get treatment.  The cause of depression is not well understood. There may be many factors involved. But if you have depression, it's not your fault.  A serious  "symptom of depression is thinking about death or suicide. If you or someone you care about talks about this or about feeling hopeless, get help right away.  It's important to know that depression can be treated. Medicine, counseling, and self-care may help.  Where can you learn more?  Go to https://www.[employer].net/patiented  Enter T185 in the search box to learn more about \"Learning About Depression Screening.\"  Current as of: July 31, 2024  Content Version: 14.4    9297-4380 [employer].   Care instructions adapted under license by your healthcare professional. If you have questions about a medical condition or this instruction, always ask your healthcare professional. [employer] disclaims any warranty or liability for your use of this information.       "

## 2025-05-07 NOTE — PROGRESS NOTES
Preventive Care Visit  [facility]  SOPHY Stoll CNP, Family Medicine  May 7, 2025      Assessment & Plan     Routine general medical examination at a health care facility  Reviewed age and gender appropriate screenings and lifestyle modifications with patient.   Most labs drawn at ER visit or previous visit, no screening labs due.   Pap next year  Colon cancer screening- has kit at home.     Elevated liver enzymes  - Elevated liver enzymes likely due to alcohol abuse. Liver function tests show alkaline phosphatase normal, bilirubin not elevated. ALT and AST levels affected by alcohol use.  - Repeat metabolic panel to monitor liver enzyme trends. Abstain from alcohol. Follow-up with behavioral health for ongoing support  -Repeat ALT/AST until stabilized. Frequency to be determined by results.   - Comprehensive metabolic panel (BMP + Alb, Alk Phos, ALT, AST, Total. Bili, TP)  - Magnesium  - Comprehensive metabolic panel (BMP + Alb, Alk Phos, ALT, AST, Total. Bili, TP)  - Magnesium    Alcohol dependence with withdrawal with complication (H)  - Elevated liver enzymes likely due to alcohol abuse. Liver function tests show alkaline phosphatase normal, bilirubin not elevated. ALT and AST levels affected by alcohol use.  - Repeat metabolic panel to monitor liver enzyme trends. Abstain from alcohol. Follow-up with behavioral health for ongoing support    Anemia, unspecified type  - Low red blood cell count with large cells, suggestive of low B12 levels, possibly due to alcohol use.  - Continue B12 supplementation. Repeat blood count to monitor improvement.  - Iron and iron binding capacity  - Ferritin  - CBC with Platelets and Reflex to Iron Studies  - Folate  - Vitamin B12  - Iron and iron binding capacity  - Ferritin  - Iron & Iron Binding Capacity  - Ferritin    Flexural atopic dermatitis  Stable chronic, refills provided to use as needed.   - triamcinolone (KENALOG) 0.1 % external cream   Dispense: 30 g; Refill: 0    Urge incontinence of urine  - Previous use of oxybutynin discontinued due to potential side effects (can cause hallucinations).  - Refer to pelvic floor physical therapy  - Physical Therapy  Referral          MED REC REQUIRED  Post Medication Reconciliation Status:     Counseling  Appropriate preventive services were addressed with this patient via screening, questionnaire, or discussion as appropriate for fall prevention, nutrition, physical activity, Tobacco-use cessation, social engagement, weight loss and cognition.  Checklist reviewing preventive services available has been given to the patient.  Reviewed patient's diet, addressing concerns and/or questions.   She is at risk for lack of exercise and has been provided with information to increase physical activity for the benefit of her well-being.   She is at risk for psychosocial distress and has been provided with information to reduce risk.   The patient's PHQ-9 score is consistent with mild depression. She was provided with information regarding depression.           Dean Clark is a 50 year old, presenting for the following:  Physical (Pt here for annual preventative visit. Pt would like to go over previous lab results and she is willing to complete other labs as needed for today's visit. )        5/7/2025    11:18 AM   Additional Questions   Roomed by Hyacinth Davalos MA Learner   Accompanied by Self          Healthy Habits:     Taking medications regularly:  0  History of Present Illness       Reason for visit:  Annual visit / Follow up on previous appointment    She eats 2-3 servings of fruits and vegetables daily.She consumes 0 sweetened beverage(s) daily.She exercises with enough effort to increase her heart rate 10 to 19 minutes per day.  She exercises with enough effort to increase her heart rate 3 or less days per week.   She is taking medications regularly.    Amber PUGA Rausch, 50 years old,  female    - Hospitalized due to alcohol abuse, experienced hallucinations, delusions, and confusion; spent the night in the ER.  - Elevated liver enzymes and abnormal blood work during hospital stay.  - Low red blood cell count and large cells indicating low B12 levels; currently taking B12 supplements.  - Magnesium levels slightly low.  - No alcohol consumption for two weeks, feeling better without cravings.  - Previously taking oxybutynin for urge incontinence, discontinued due to concerns about contributing to symptoms experienced during hospital stay.        Advance Care Planning    Discussed advance care planning with patient; however, patient declined at this time.        5/7/2025   General Health   How would you rate your overall physical health? Good   Feel stress (tense, anxious, or unable to sleep) Rather much   (!) STRESS CONCERN      5/7/2025   Nutrition   Three or more servings of calcium each day? Yes   Diet: Regular (no restrictions)   How many servings of fruit and vegetables per day? (!) 2-3   How many sweetened beverages each day? 0-1         5/7/2025   Exercise   Days per week of moderate/strenous exercise 2 days   Average minutes spent exercising at this level 20 min   (!) EXERCISE CONCERN      5/7/2025   Social Factors   Frequency of gathering with friends or relatives Once a week   Worry food won't last until get money to buy more No   Food not last or not have enough money for food? No   Do you have housing? (Housing is defined as stable permanent housing and does not include staying outside in a car, in a tent, in an abandoned building, in an overnight shelter, or couch-surfing.) Yes   Are you worried about losing your housing? No   Lack of transportation? No   Unable to get utilities (heat,electricity)? No         5/7/2025   Fall Risk   Fallen 2 or more times in the past year? No    Trouble with walking or balance? No        Proxy-reported          5/7/2025   Dental   Dentist two times every  year? Yes       Today's PHQ-9 Score:       5/7/2025    11:03 AM   PHQ-9 SCORE   PHQ-9 Total Score MyChart 7 (Mild depression)   PHQ-9 Total Score 7        Patient-reported         5/7/2025   Substance Use   Alcohol more than 3/day or more than 7/wk Not Applicable   Do you use any other substances recreationally? No     Social History     Tobacco Use    Smoking status: Never     Passive exposure: Never    Smokeless tobacco: Never   Vaping Use    Vaping status: Never Used   Substance Use Topics    Alcohol use: Yes     Comment: socially    Drug use: No           2/28/2025   LAST FHS-7 RESULTS   1st degree relative breast or ovarian cancer No   Any relative bilateral breast cancer No   Any male have breast cancer No   Any ONE woman have BOTH breast AND ovarian cancer No   Any woman with breast cancer before 50yrs No   2 or more relatives with breast AND/OR ovarian cancer No   2 or more relatives with breast AND/OR bowel cancer No        Mammogram Screening - Mammogram every 1-2 years updated in Health Maintenance based on mutual decision making        5/7/2025   STI Screening   New sexual partner(s) since last STI/HIV test? No     History of abnormal Pap smear: No - age 30- 64 PAP with HPV every 5 years recommended        1/24/2011    12:00 AM 10/23/2009    12:00 AM 9/11/2008    12:00 AM   PAP / HPV   PAP (Historical) NIL  NIL  NIL      ASCVD Risk   The 10-year ASCVD risk score (Philippe MULLEN, et al., 2019) is: 0.8%    Values used to calculate the score:      Age: 50 years      Sex: Female      Is Non- : No      Diabetic: No      Tobacco smoker: No      Systolic Blood Pressure: 99 mmHg      Is BP treated: Yes      HDL Cholesterol: 81 mg/dL      Total Cholesterol: 246 mg/dL            5/7/2025   Contraception/Family Planning   Questions about contraception or family planning No        Reviewed and updated as needed this visit by Provider   Tobacco  Allergies  Meds  Problems  Med Hx  Surg  "Hx  Fam Hx            Past Medical History:   Diagnosis Date    Abnormal maternal glucose tolerance, complicating pregnancy, childbirth, or the puerperium, unspecified as to episode of care     Congenital vascular hamartomas     Port-Wine on Right breast/chest    Depressive disorder     Hypertension      Past Surgical History:   Procedure Laterality Date    [facility] APPENDECTOMY      1996        ROS: 10 point ROS neg other than the symptoms noted above in the HPI.       Objective    Exam  BP 99/60 (BP Location: Right arm, Patient Position: Sitting, Cuff Size: Adult Regular)   Pulse 77   Temp 98.4  F (36.9  C) (Oral)   Resp 12   Ht 1.626 m (5' 4\")   Wt 61.1 kg (134 lb 11.2 oz)   LMP 05/07/2025 (Exact Date)   SpO2 97%   BMI 23.12 kg/m     Estimated body mass index is 23.12 kg/m  as calculated from the following:    Height as of this encounter: 1.626 m (5' 4\").    Weight as of this encounter: 61.1 kg (134 lb 11.2 oz).    Physical Exam  Vitals and nursing note reviewed.   Constitutional:       General: She is not in acute distress.     Appearance: Normal appearance. She is not ill-appearing, toxic-appearing or diaphoretic.   HENT:      Head: Normocephalic and atraumatic.      Right Ear: Tympanic membrane, ear canal and external ear normal.      Left Ear: Tympanic membrane, ear canal and external ear normal.      Nose: Nose normal.      Mouth/Throat:      Mouth: Mucous membranes are moist.      Pharynx: No oropharyngeal exudate or posterior oropharyngeal erythema.   Eyes:      General: Lids are normal.      Extraocular Movements: Extraocular movements intact.      Conjunctiva/sclera: Conjunctivae normal.      Pupils: Pupils are equal, round, and reactive to light.   Neck:      Thyroid: No thyroid mass, thyromegaly or thyroid tenderness.   Cardiovascular:      Rate and Rhythm: Normal rate and regular rhythm.      Pulses: Normal pulses.      Heart sounds: Normal heart sounds.   Pulmonary:      Effort: Pulmonary effort " is normal.      Breath sounds: Normal breath sounds.   Abdominal:      General: Abdomen is flat. Bowel sounds are normal.      Palpations: Abdomen is soft.      Tenderness: There is no abdominal tenderness.      Hernia: No hernia is present.   Musculoskeletal:         General: Normal range of motion.      Cervical back: Normal range of motion and neck supple. No rigidity or tenderness.   Lymphadenopathy:      Cervical: No cervical adenopathy.   Skin:     General: Skin is warm and dry.      Capillary Refill: Capillary refill takes less than 2 seconds.   Neurological:      General: No focal deficit present.      Mental Status: She is alert.      Deep Tendon Reflexes: Reflexes are normal and symmetric.   Psychiatric:         Mood and Affect: Mood normal.         Speech: Speech normal.         Behavior: Behavior normal. Behavior is cooperative.         Thought Content: Thought content normal.         Judgment: Judgment normal.               Signed Electronically by: SOPHY Stoll CNP

## 2025-05-12 ENCOUNTER — RESULTS FOLLOW-UP (OUTPATIENT)
Dept: FAMILY MEDICINE | Facility: CLINIC | Age: 50
End: 2025-05-12

## 2025-07-10 ENCOUNTER — THERAPY VISIT (OUTPATIENT)
Dept: PHYSICAL THERAPY | Facility: CLINIC | Age: 50
End: 2025-07-10
Payer: COMMERCIAL

## 2025-07-10 DIAGNOSIS — N39.41 URGE INCONTINENCE OF URINE: ICD-10-CM

## 2025-07-10 NOTE — PROGRESS NOTES
PHYSICAL THERAPY EVALUATION  Type of Visit: Evaluation       Fall Risk Screen:  Have you fallen 2 or more times in the past year?: No  Have you fallen and had an injury in the past year?: No    Subjective   Pt reporting urge incontinence that has gotten worse over the past several years.   Once see's bathroom after rushing has accident.        Presenting condition or subjective complaint: Feeling of urgency when need to urinate, sometimes I simply can't control it.  Date of onset: 05/07/25 (date of referral)    Relevant medical history: High blood pressure; Incontinence   Dates & types of surgery: Appendectomy 1995    Prior diagnostic imaging/testing results:       Prior therapy history for the same diagnosis, illness or injury: No        Living Environment  Social support: With family members   Type of home: [facility]   Stairs to enter the home: No       Ramp: No   Stairs inside the home: Yes 15 Is there a railing: Yes     Help at home: None  Equipment owned:       Employment: No    Hobbies/Interests:      Patient goals for therapy:  decrease urge incontinence     Objective      PELVIC EVALUATION  ADDITIONAL HISTORY:  Sex assigned at birth: Female  Gender identity: Female    Pronouns: She/Her Hers      Bladder History:  Feels bladder filling: Yes  Triggers for feeling of inability to wait to go to the bathroom: No    How long can you wait to urinate: Depends on how bad I have to go, if it's urgent I need to hurry   can hold it 5-10 min  Urinary frequency every 90 min to 2 hours  Gets up at night to urinate: Yes 3   has to control when rolling out of bed  Can stop the flow of urine when urinating: Sometimes  Volume of urine usually released: Average   sometimes will have a small amount of urine,  Fully empties bladder  Other issues:    Number of bladder infections in last 12 months:  none  Fluid intake per day:   92mL  water or juice,  Medications taken for bladder: No     Activities causing urine leak: Cough;  Sneeze; Hurrying to the bathroom due to a strong urge to urinate (pee)    rushing to the bathroom  Amount of urine typically leaked: Sometimes just a bit but other times a lot  Pads used to help with leaking: No        Bowel History:  Frequency of bowel movement: every 3-4 days  Consistency of stool: Hard    Ignores the urge to defecate: No  Other bowel issues:    Length of time spent trying to have a bowel movement:  2-3  Feels like she has to go    Sexual Function History:  Sexual orientation: Straight    Sexually active: No  Lubrication used: No No  Pelvic pain:      Pain or difficulty with orgasms/erection/ejaculation: No    State of menopause: Perimenopause (have not gone through menopause yet)  Hormone medications: No      Are you currently pregnant: No  Number of previous pregnancies: 2  Number of deliveries: 2  If you have delivered before, did you have any of these issues during delivery: Tearing; Vaginal delivery  Have you been diagnosed with pelvic prolapse or abdominal separation: No  Do you get regular exercise: Yes  I do this type of exercise: Walking  Have you tried pelvic floor strengthening exercises for 4 weeks: No  Do you have any history of trauma that is relevant to your care that you d like to share: No      Discussed reason for referral regarding pelvic health needs and external/internal pelvic floor muscle examination with patient/guardian.  Opportunity provided to ask questions and verbal consent for assessment and intervention was given.      PELVIC EXAM  External Visual Inspection:  At rest: Normal  With voluntary pelvic floor contraction: Perineal elevation  Relaxation of PFM: Partial/delayed relaxation  With intra-abdominal pressure: Cough: Perineal descent  Bearing down as defecation: Perineal descent    Integumentary:   Introitus: Unremarkable    External Digital Palpation per Perineum:   Ischiocavernosis: Unremarkable  Bulbo cavernosis: Unremarkable  Transverse perineal:  Unremarkable  Levator ani: Unremarkable  Perineal body: Unremarkable  Coccyx: DNT    Internal Digital Palpation:  Per Vagina:  Tenderness  Myofascial Resistance to Palpation: Firm  Digital Muscle Performance: P (Power): 3+/5  E (Endurance): 8  R (Repetitions): 3  F (Fast Twitch): 4  Compensations: Adductors, Breath holding  Relaxation Post-Contraction: Partial/delayed relaxation    Pelvic Organ Prolapse: no      ABDOMINAL ASSESSMENT  Diastasis Rectus Abdominis (AXEL):  will assess at follow up      Assessment & Plan   CLINICAL IMPRESSIONS  Medical Diagnosis: Urge incontinence of urine    Treatment Diagnosis: Urge incontinence of urine   Impression/Assessment: Patient is a 50 year old female with Urge incontinence of urine  complaints.  The following significant findings have been identified: Pain, Decreased strength, Impaired muscle performance, and Decreased activity tolerance. These impairments interfere with their ability to perform self care tasks, recreational activities, household mobility, and community mobility as compared to previous level of function.     Clinical Decision Making (Complexity):  Clinical Presentation: Stable/Uncomplicated  Clinical Presentation Rationale: based on medical and personal factors listed in PT evaluation  Clinical Decision Making (Complexity): Low complexity    PLAN OF CARE  Treatment Interventions:  Interventions: Gait Training, Manual Therapy, Neuromuscular Re-education, Therapeutic Activity, Therapeutic Exercise, Self-Care/Home Management    Long Term Goals     PT Goal 1  Goal Identifier: urge incontinence  Goal Description: Pt will report minimal to no urge incontinence when on way to the bathroom  Rationale: to maximize safety and independence with performance of ADLs and functional tasks;to maximize safety and independence within the home;to maximize safety and independence within the community;to maximize safety and independence with transportation;to maximize safety and  independence with self cares  Target Date: 10/05/25      Frequency of Treatment: 2-3x/month  Duration of Treatment: 3 months    Recommended Referrals to Other Professionals:   Education Assessment:   Learner/Method: No Barriers to Learning  Education Comments: no concerns    Risks and benefits of evaluation/treatment have been explained.   Patient/Family/caregiver agrees with Plan of Care.     Evaluation Time:     PT Eval, Low Complexity Minutes (39504): 30       Signing Clinician: Amparo Couch PT             Patient informed